# Patient Record
Sex: MALE | Race: WHITE | NOT HISPANIC OR LATINO | Employment: OTHER | URBAN - METROPOLITAN AREA
[De-identification: names, ages, dates, MRNs, and addresses within clinical notes are randomized per-mention and may not be internally consistent; named-entity substitution may affect disease eponyms.]

---

## 2017-01-09 RX ORDER — ATORVASTATIN CALCIUM 40 MG/1
TABLET, FILM COATED ORAL
Qty: 15 TABLET | Refills: 3 | Status: SHIPPED | OUTPATIENT
Start: 2017-01-09 | End: 2017-05-12 | Stop reason: SDUPTHER

## 2017-02-06 ENCOUNTER — CLINICAL SUPPORT NO REQUIREMENTS (OUTPATIENT)
Dept: CARDIOLOGY | Facility: CLINIC | Age: 82
End: 2017-02-06

## 2017-02-06 DIAGNOSIS — I49.5 SINOATRIAL NODE DYSFUNCTION (HCC): Primary | ICD-10-CM

## 2017-02-06 PROCEDURE — 93288 INTERROG EVL PM/LDLS PM IP: CPT | Performed by: INTERNAL MEDICINE

## 2017-03-21 ENCOUNTER — OFFICE VISIT (OUTPATIENT)
Dept: INTERNAL MEDICINE | Age: 82
End: 2017-03-21

## 2017-03-21 VITALS
RESPIRATION RATE: 12 BRPM | SYSTOLIC BLOOD PRESSURE: 110 MMHG | DIASTOLIC BLOOD PRESSURE: 60 MMHG | WEIGHT: 178 LBS | BODY MASS INDEX: 23.59 KG/M2 | HEIGHT: 73 IN | HEART RATE: 66 BPM

## 2017-03-21 DIAGNOSIS — E78.2 MIXED HYPERLIPIDEMIA: Primary | ICD-10-CM

## 2017-03-21 LAB
ALBUMIN SERPL-MCNC: 4.1 G/DL (ref 3.5–5.2)
ALBUMIN/GLOB SERPL: 1.2 G/DL
ALP SERPL-CCNC: 83 U/L (ref 39–117)
ALT SERPL-CCNC: 12 U/L (ref 1–41)
AST SERPL-CCNC: 20 U/L (ref 1–40)
BILIRUB SERPL-MCNC: 0.5 MG/DL (ref 0.1–1.2)
BUN SERPL-MCNC: 22 MG/DL (ref 8–23)
BUN/CREAT SERPL: 19.6 (ref 7–25)
CALCIUM SERPL-MCNC: 9.5 MG/DL (ref 8.6–10.5)
CHLORIDE SERPL-SCNC: 106 MMOL/L (ref 98–107)
CHOLEST SERPL-MCNC: 125 MG/DL (ref 0–200)
CO2 SERPL-SCNC: 30.7 MMOL/L (ref 22–29)
CREAT SERPL-MCNC: 1.12 MG/DL (ref 0.76–1.27)
GLOBULIN SER CALC-MCNC: 3.3 GM/DL
GLUCOSE SERPL-MCNC: 96 MG/DL (ref 65–99)
HDLC SERPL-MCNC: 49 MG/DL (ref 40–60)
LDLC SERPL CALC-MCNC: 67 MG/DL (ref 0–100)
POTASSIUM SERPL-SCNC: 4.9 MMOL/L (ref 3.5–5.2)
PROT SERPL-MCNC: 7.4 G/DL (ref 6–8.5)
SODIUM SERPL-SCNC: 146 MMOL/L (ref 136–145)
TRIGL SERPL-MCNC: 46 MG/DL (ref 0–150)
VLDLC SERPL CALC-MCNC: 9.2 MG/DL (ref 5–40)

## 2017-03-21 PROCEDURE — 99213 OFFICE O/P EST LOW 20 MIN: CPT | Performed by: INTERNAL MEDICINE

## 2017-03-21 NOTE — PROGRESS NOTES
Amaury Moulton is a 82 y.o. male who presents with   Chief Complaint   Patient presents with   • Hyperlipidemia     Checkup; lab updates.   .    Hyperlipidemia   This is a chronic problem. The current episode started more than 1 year ago. The problem is controlled. Recent lipid tests were reviewed and are normal. Current antihyperlipidemic treatment includes statins. The current treatment provides moderate improvement of lipids. There are no compliance problems.         The following portions of the patient's history were reviewed and updated as appropriate: allergies, current medications, past medical history and problem list.    Review of Systems   Constitutional: Negative.    HENT: Negative.    Eyes: Negative.    Respiratory: Negative.    Cardiovascular: Negative.    Genitourinary: Negative.    Musculoskeletal: Negative.    Skin: Negative.    Neurological: Negative.    Psychiatric/Behavioral: Negative.        Objective   Physical Exam   Constitutional: He is oriented to person, place, and time. He appears well-developed and well-nourished. No distress.   HENT:   Head: Normocephalic and atraumatic.   Eyes: Conjunctivae and EOM are normal. Pupils are equal, round, and reactive to light.   Neck: Normal range of motion. Neck supple. No thyromegaly present.   Neck exam negative.  Carotid auscultation normal-no bruits heard.   Cardiovascular: Normal rate, regular rhythm, normal heart sounds and intact distal pulses.  Exam reveals no gallop and no friction rub.    No murmur heard.  Pulmonary/Chest: Effort normal and breath sounds normal. No respiratory distress. He has no wheezes. He has no rales. He exhibits no tenderness.   Neurological: He is alert and oriented to person, place, and time.   Psychiatric: He has a normal mood and affect. His behavior is normal. Judgment and thought content normal.   Nursing note and vitals reviewed.      Assessment/Plan   Amaury was seen today for hyperlipidemia.    Diagnoses and all  orders for this visit:    Mixed hyperlipidemia  -     Comprehensive Metabolic Panel  -     Lipid Panel          Plan: Labs as above.Today's exam unremarkable for any new problems or events.  Continue all current treatment as prescribed.  A follow-up checkup/lab update visit is advised for 6 months.

## 2017-04-27 ENCOUNTER — CLINICAL SUPPORT NO REQUIREMENTS (OUTPATIENT)
Dept: CARDIOLOGY | Facility: CLINIC | Age: 82
End: 2017-04-27

## 2017-04-27 ENCOUNTER — OFFICE VISIT (OUTPATIENT)
Dept: CARDIOLOGY | Facility: CLINIC | Age: 82
End: 2017-04-27

## 2017-04-27 VITALS
HEART RATE: 65 BPM | WEIGHT: 172 LBS | SYSTOLIC BLOOD PRESSURE: 96 MMHG | HEIGHT: 73 IN | BODY MASS INDEX: 22.8 KG/M2 | DIASTOLIC BLOOD PRESSURE: 62 MMHG

## 2017-04-27 DIAGNOSIS — R00.1 BRADYCARDIA: Primary | ICD-10-CM

## 2017-04-27 DIAGNOSIS — I49.5 SICK SINUS SYNDROME (HCC): Primary | ICD-10-CM

## 2017-04-27 PROCEDURE — 99213 OFFICE O/P EST LOW 20 MIN: CPT | Performed by: INTERNAL MEDICINE

## 2017-04-27 PROCEDURE — 93280 PM DEVICE PROGR EVAL DUAL: CPT | Performed by: INTERNAL MEDICINE

## 2017-04-27 NOTE — PROGRESS NOTES
Date of Office Visit: 2017  Encounter Provider: Terrance Allison MD  Place of Service: [facility] CARDIOLOGY  Patient Name: Amaury Moulton  :1935      Chief Complaint   Patient presents with   • Slow Heart Rate     History of Present Illness    Patient is an 82-year-old white male with a history of bradycardia, sick sinus syndrome status post permanent pacemaker implant.  He returns to the office today for follow-up.  He's feeling well without any major complaints other than occasional fatigue issues.    Past Medical History:   Diagnosis Date   • Arrhythmia    • Atherosclerotic vascular disease    • Benign prostatic hypertrophy    • Bladder calculus     SEES DR. MOYA - UROL   • Cardiomegaly    • Hyperlipidemia    • Inguinal hernia    • Peripheral edema    • Urinary tract infection          Past Surgical History:   Procedure Laterality Date   • CARDIAC PACEMAKER PLACEMENT      Octoberâ€“2014â€“Dr. Allison and associates.   • COLONOSCOPY      €“normalâ€“Dr. Ahmet Harvey (surgery)   • HERNIA REPAIR      €“Dr. Ahmet Harveyâ€“patient had surgery for a left inguinal hernia however at the time of surgery no hernia was found either internally or externally according to the surgical report.           Current Outpatient Prescriptions:   •  atorvastatin (LIPITOR) 40 MG tablet, TAKE ONE-HALF TABLET BY MOUTH DAILY, Disp: 15 tablet, Rfl: 3  •  finasteride (PROSCAR) 5 MG tablet, Take  by mouth daily., Disp: , Rfl:   •  tamsulosin (FLOMAX) 0.4 MG capsule 24 hr capsule, Take  by mouth., Disp: , Rfl:       Social History     Social History   • Marital status:      Spouse name: N/A   • Number of children: N/A   • Years of education: N/A     Occupational History   • Not on file.     Social History Main Topics   • Smoking status: Never Smoker   • Smokeless tobacco: Not on file   • Alcohol use Not on file   • Drug use: Not on file   • Sexual activity: Not  "on file     Other Topics Concern   • Not on file     Social History Narrative         Review of Systems   Constitution: Negative.   HENT: Negative.    Eyes: Negative.    Cardiovascular: Negative.    Respiratory: Negative.    Endocrine: Negative.    Skin: Negative.    Musculoskeletal: Negative.    Gastrointestinal: Negative.    Neurological: Negative.    Psychiatric/Behavioral: Negative.        Procedures    Procedures       Objective:    BP 96/62  Pulse 65  Ht 73\" (185.4 cm)  Wt 172 lb (78 kg)  BMI 22.69 kg/m2        Physical Exam   Constitutional: He is oriented to person, place, and time. He appears well-developed and well-nourished.   HENT:   Head: Normocephalic.   Eyes: Pupils are equal, round, and reactive to light.   Neck: Normal range of motion. No JVD present. Carotid bruit is not present. No thyromegaly present.   Cardiovascular: Normal rate, regular rhythm, S1 normal, S2 normal, normal heart sounds and intact distal pulses.  Exam reveals no gallop and no friction rub.    No murmur heard.  Pulmonary/Chest: Effort normal and breath sounds normal.   Abdominal: Soft. Bowel sounds are normal.   Musculoskeletal: He exhibits no edema.   Neurological: He is alert and oriented to person, place, and time.   Skin: Skin is warm, dry and intact. No erythema.   Psychiatric: He has a normal mood and affect.   Vitals reviewed.          Assessment:       Diagnosis Plan   1. Bradycardia         Patient's pacemaker is working appropriately.     Plan:       He will follow-up in one year  "

## 2017-05-07 ENCOUNTER — DOCUMENTATION (OUTPATIENT)
Dept: INTERNAL MEDICINE | Age: 82
End: 2017-05-07

## 2017-05-08 ENCOUNTER — TELEPHONE (OUTPATIENT)
Dept: ORTHOPEDIC SURGERY | Facility: CLINIC | Age: 82
End: 2017-05-08

## 2017-05-08 ENCOUNTER — TELEPHONE (OUTPATIENT)
Dept: INTERNAL MEDICINE | Age: 82
End: 2017-05-08

## 2017-05-08 DIAGNOSIS — S42.296S OTHER CLOSED NONDISPLACED FRACTURE OF PROXIMAL END OF HUMERUS, UNSPECIFIED LATERALITY, SEQUELA: Primary | ICD-10-CM

## 2017-05-10 ENCOUNTER — OFFICE VISIT (OUTPATIENT)
Dept: ORTHOPEDIC SURGERY | Facility: CLINIC | Age: 82
End: 2017-05-10

## 2017-05-10 VITALS — BODY MASS INDEX: 23.19 KG/M2 | TEMPERATURE: 98.1 F | WEIGHT: 175 LBS | HEIGHT: 73 IN

## 2017-05-10 DIAGNOSIS — S49.91XA RIGHT SHOULDER INJURY, INITIAL ENCOUNTER: Primary | ICD-10-CM

## 2017-05-10 PROCEDURE — 99204 OFFICE O/P NEW MOD 45 MIN: CPT | Performed by: ORTHOPAEDIC SURGERY

## 2017-05-10 PROCEDURE — 73030 X-RAY EXAM OF SHOULDER: CPT | Performed by: ORTHOPAEDIC SURGERY

## 2017-05-11 ENCOUNTER — TELEPHONE (OUTPATIENT)
Dept: CARDIOLOGY | Facility: CLINIC | Age: 82
End: 2017-05-11

## 2017-05-11 ENCOUNTER — TELEPHONE (OUTPATIENT)
Dept: INTERNAL MEDICINE | Age: 82
End: 2017-05-11

## 2017-05-11 RX ORDER — SODIUM CHLORIDE 0.9 % (FLUSH) 0.9 %
1-10 SYRINGE (ML) INJECTION AS NEEDED
Status: CANCELLED | OUTPATIENT
Start: 2017-05-11

## 2017-05-11 RX ORDER — CEFAZOLIN SODIUM 2 G/100ML
2 INJECTION, SOLUTION INTRAVENOUS ONCE
Status: CANCELLED | OUTPATIENT
Start: 2017-05-11 | End: 2017-05-11

## 2017-05-12 ENCOUNTER — APPOINTMENT (OUTPATIENT)
Dept: PREADMISSION TESTING | Facility: HOSPITAL | Age: 82
End: 2017-05-12

## 2017-05-12 VITALS
SYSTOLIC BLOOD PRESSURE: 116 MMHG | WEIGHT: 176 LBS | OXYGEN SATURATION: 99 % | HEIGHT: 73 IN | HEART RATE: 61 BPM | TEMPERATURE: 96.4 F | BODY MASS INDEX: 23.33 KG/M2 | DIASTOLIC BLOOD PRESSURE: 79 MMHG | RESPIRATION RATE: 20 BRPM

## 2017-05-12 LAB
ANION GAP SERPL CALCULATED.3IONS-SCNC: 12 MMOL/L
BUN BLD-MCNC: 41 MG/DL (ref 8–23)
BUN/CREAT SERPL: 24 (ref 7–25)
CALCIUM SPEC-SCNC: 9 MG/DL (ref 8.6–10.5)
CHLORIDE SERPL-SCNC: 102 MMOL/L (ref 98–107)
CO2 SERPL-SCNC: 25 MMOL/L (ref 22–29)
CREAT BLD-MCNC: 1.71 MG/DL (ref 0.76–1.27)
DEPRECATED RDW RBC AUTO: 47.5 FL (ref 37–54)
ERYTHROCYTE [DISTWIDTH] IN BLOOD BY AUTOMATED COUNT: 13.7 % (ref 11.5–14.5)
GFR SERPL CREATININE-BSD FRML MDRD: 39 ML/MIN/1.73
GLUCOSE BLD-MCNC: 118 MG/DL (ref 65–99)
HCT VFR BLD AUTO: 35.5 % (ref 40.4–52.2)
HGB BLD-MCNC: 11.3 G/DL (ref 13.7–17.6)
MCH RBC QN AUTO: 30.3 PG (ref 27–32.7)
MCHC RBC AUTO-ENTMCNC: 31.8 G/DL (ref 32.6–36.4)
MCV RBC AUTO: 95.2 FL (ref 79.8–96.2)
PLATELET # BLD AUTO: 277 10*3/MM3 (ref 140–500)
PMV BLD AUTO: 9.7 FL (ref 6–12)
POTASSIUM BLD-SCNC: 4.2 MMOL/L (ref 3.5–5.2)
RBC # BLD AUTO: 3.73 10*6/MM3 (ref 4.6–6)
SODIUM BLD-SCNC: 139 MMOL/L (ref 136–145)
WBC NRBC COR # BLD: 6.7 10*3/MM3 (ref 4.5–10.7)

## 2017-05-12 PROCEDURE — 80048 BASIC METABOLIC PNL TOTAL CA: CPT | Performed by: ORTHOPAEDIC SURGERY

## 2017-05-12 PROCEDURE — 36415 COLL VENOUS BLD VENIPUNCTURE: CPT

## 2017-05-12 PROCEDURE — 93005 ELECTROCARDIOGRAM TRACING: CPT

## 2017-05-12 PROCEDURE — 85027 COMPLETE CBC AUTOMATED: CPT | Performed by: ORTHOPAEDIC SURGERY

## 2017-05-12 PROCEDURE — 93010 ELECTROCARDIOGRAM REPORT: CPT | Performed by: INTERNAL MEDICINE

## 2017-05-12 RX ORDER — IBUPROFEN 200 MG
200 TABLET ORAL EVERY 6 HOURS PRN
Status: ON HOLD | COMMUNITY
End: 2018-01-01

## 2017-05-12 RX ORDER — ACETAMINOPHEN 500 MG
500 TABLET ORAL EVERY 6 HOURS PRN
COMMUNITY
End: 2017-05-15 | Stop reason: HOSPADM

## 2017-05-12 RX ORDER — ATORVASTATIN CALCIUM 40 MG/1
40 TABLET, FILM COATED ORAL EVERY OTHER DAY
COMMUNITY
End: 2017-06-20 | Stop reason: SDUPTHER

## 2017-05-15 ENCOUNTER — ANESTHESIA EVENT (OUTPATIENT)
Dept: PERIOP | Facility: HOSPITAL | Age: 82
End: 2017-05-15

## 2017-05-15 ENCOUNTER — HOSPITAL ENCOUNTER (OUTPATIENT)
Facility: HOSPITAL | Age: 82
Setting detail: SURGERY ADMIT
Discharge: HOME | End: 2017-05-15
Attending: ORTHOPAEDIC SURGERY | Admitting: ORTHOPAEDIC SURGERY

## 2017-05-15 ENCOUNTER — ANESTHESIA (OUTPATIENT)
Dept: PERIOP | Facility: HOSPITAL | Age: 82
End: 2017-05-15

## 2017-05-15 ENCOUNTER — APPOINTMENT (OUTPATIENT)
Dept: GENERAL RADIOLOGY | Facility: HOSPITAL | Age: 82
End: 2017-05-15

## 2017-05-15 VITALS
DIASTOLIC BLOOD PRESSURE: 72 MMHG | OXYGEN SATURATION: 99 % | TEMPERATURE: 97.9 F | HEART RATE: 61 BPM | SYSTOLIC BLOOD PRESSURE: 135 MMHG | RESPIRATION RATE: 18 BRPM

## 2017-05-15 DIAGNOSIS — S49.91XA RIGHT SHOULDER INJURY, INITIAL ENCOUNTER: ICD-10-CM

## 2017-05-15 PROCEDURE — 25010000002 FENTANYL CITRATE (PF) 100 MCG/2ML SOLUTION: Performed by: ANESTHESIOLOGY

## 2017-05-15 PROCEDURE — 25010000002 DEXAMETHASONE PER 1 MG: Performed by: NURSE ANESTHETIST, CERTIFIED REGISTERED

## 2017-05-15 PROCEDURE — C1713 ANCHOR/SCREW BN/BN,TIS/BN: HCPCS | Performed by: ORTHOPAEDIC SURGERY

## 2017-05-15 PROCEDURE — 25010000003 CEFAZOLIN IN DEXTROSE 2-4 GM/100ML-% SOLUTION: Performed by: ORTHOPAEDIC SURGERY

## 2017-05-15 PROCEDURE — 25010000002 FENTANYL CITRATE (PF) 100 MCG/2ML SOLUTION: Performed by: NURSE ANESTHETIST, CERTIFIED REGISTERED

## 2017-05-15 PROCEDURE — 25010000002 ONDANSETRON PER 1 MG: Performed by: NURSE ANESTHETIST, CERTIFIED REGISTERED

## 2017-05-15 PROCEDURE — 25010000002 MIDAZOLAM PER 1 MG: Performed by: ANESTHESIOLOGY

## 2017-05-15 PROCEDURE — 23615 OPTX PROX HUMRL FX W/INT FIX: CPT | Performed by: ORTHOPAEDIC SURGERY

## 2017-05-15 PROCEDURE — 25010000002 PROPOFOL 10 MG/ML EMULSION: Performed by: NURSE ANESTHETIST, CERTIFIED REGISTERED

## 2017-05-15 PROCEDURE — 25010000002 PHENYLEPHRINE PER 1 ML: Performed by: NURSE ANESTHETIST, CERTIFIED REGISTERED

## 2017-05-15 PROCEDURE — 76000 FLUOROSCOPY <1 HR PHYS/QHP: CPT

## 2017-05-15 PROCEDURE — 73060 X-RAY EXAM OF HUMERUS: CPT

## 2017-05-15 DEVICE — PLT HUM LCP PROX STD SS 3H 3.5X90MM: Type: IMPLANTABLE DEVICE | Status: FUNCTIONAL

## 2017-05-15 DEVICE — SCRW LK S/TAP STRDRV 3.5X32MM: Type: IMPLANTABLE DEVICE | Status: FUNCTIONAL

## 2017-05-15 DEVICE — SCRW LK S/TAP STRDRV 3.5X46MM: Type: IMPLANTABLE DEVICE | Status: FUNCTIONAL

## 2017-05-15 DEVICE — SCRW LK S/TAP STRDRV 3.5X40MM: Type: IMPLANTABLE DEVICE | Status: FUNCTIONAL

## 2017-05-15 DEVICE — SCRW LK S/TAP STRDRV 3.5X42MM: Type: IMPLANTABLE DEVICE | Status: FUNCTIONAL

## 2017-05-15 DEVICE — SCRW LK S/TAP STRDRV 3.5X38MM: Type: IMPLANTABLE DEVICE | Status: FUNCTIONAL

## 2017-05-15 DEVICE — SCRW LK S/TAP STRDRV 3.5X50MM: Type: IMPLANTABLE DEVICE | Status: FUNCTIONAL

## 2017-05-15 DEVICE — GII QUICKANCHOR PLUS SIZE 2 (5 METRIC) PANACRYL BRAIDED ABSORBABLE SUTURE 36 INCHES (91CM), DOUBLE ARMED WITH CP-2 NEEDLES, WITH DISPOSABLE INSERTER.
Type: IMPLANTABLE DEVICE | Status: FUNCTIONAL
Brand: GII QUICKANCHOR PANACRYL

## 2017-05-15 DEVICE — SCRW CORT S/TAP 3.5X38MM: Type: IMPLANTABLE DEVICE | Status: FUNCTIONAL

## 2017-05-15 RX ORDER — ROCURONIUM BROMIDE 10 MG/ML
INJECTION, SOLUTION INTRAVENOUS AS NEEDED
Status: DISCONTINUED | OUTPATIENT
Start: 2017-05-15 | End: 2017-05-15 | Stop reason: SURG

## 2017-05-15 RX ORDER — FAMOTIDINE 10 MG/ML
20 INJECTION, SOLUTION INTRAVENOUS ONCE
Status: COMPLETED | OUTPATIENT
Start: 2017-05-15 | End: 2017-05-15

## 2017-05-15 RX ORDER — DOCUSATE SODIUM 100 MG/1
100 CAPSULE, LIQUID FILLED ORAL 2 TIMES DAILY
Qty: 50 CAPSULE | Refills: 0 | Status: SHIPPED | OUTPATIENT
Start: 2017-05-15 | End: 2017-10-13

## 2017-05-15 RX ORDER — ONDANSETRON 4 MG/1
4 TABLET, FILM COATED ORAL EVERY 8 HOURS PRN
Qty: 20 TABLET | Refills: 0 | Status: SHIPPED | OUTPATIENT
Start: 2017-05-15 | End: 2017-09-22

## 2017-05-15 RX ORDER — MIDAZOLAM HYDROCHLORIDE 1 MG/ML
1 INJECTION INTRAMUSCULAR; INTRAVENOUS
Status: DISCONTINUED | OUTPATIENT
Start: 2017-05-15 | End: 2017-05-15 | Stop reason: HOSPADM

## 2017-05-15 RX ORDER — IPRATROPIUM BROMIDE AND ALBUTEROL SULFATE 2.5; .5 MG/3ML; MG/3ML
3 SOLUTION RESPIRATORY (INHALATION) ONCE AS NEEDED
Status: DISCONTINUED | OUTPATIENT
Start: 2017-05-15 | End: 2017-05-15 | Stop reason: HOSPADM

## 2017-05-15 RX ORDER — SODIUM CHLORIDE, SODIUM LACTATE, POTASSIUM CHLORIDE, CALCIUM CHLORIDE 600; 310; 30; 20 MG/100ML; MG/100ML; MG/100ML; MG/100ML
9 INJECTION, SOLUTION INTRAVENOUS CONTINUOUS
Status: DISCONTINUED | OUTPATIENT
Start: 2017-05-15 | End: 2017-05-15 | Stop reason: HOSPADM

## 2017-05-15 RX ORDER — HYDRALAZINE HYDROCHLORIDE 20 MG/ML
5 INJECTION INTRAMUSCULAR; INTRAVENOUS
Status: DISCONTINUED | OUTPATIENT
Start: 2017-05-15 | End: 2017-05-15 | Stop reason: HOSPADM

## 2017-05-15 RX ORDER — SODIUM CHLORIDE 0.9 % (FLUSH) 0.9 %
1-10 SYRINGE (ML) INJECTION AS NEEDED
Status: DISCONTINUED | OUTPATIENT
Start: 2017-05-15 | End: 2017-05-15 | Stop reason: HOSPADM

## 2017-05-15 RX ORDER — HYDROCODONE BITARTRATE AND ACETAMINOPHEN 5; 325 MG/1; MG/1
1-2 TABLET ORAL EVERY 4 HOURS PRN
Qty: 50 TABLET | Refills: 0 | Status: SHIPPED | OUTPATIENT
Start: 2017-05-15 | End: 2017-05-18 | Stop reason: SDUPTHER

## 2017-05-15 RX ORDER — HYDROCODONE BITARTRATE AND ACETAMINOPHEN 5; 325 MG/1; MG/1
1 TABLET ORAL EVERY 6 HOURS PRN
Status: DISCONTINUED | OUTPATIENT
Start: 2017-05-15 | End: 2017-05-15 | Stop reason: HOSPADM

## 2017-05-15 RX ORDER — FENTANYL CITRATE 50 UG/ML
25 INJECTION, SOLUTION INTRAMUSCULAR; INTRAVENOUS
Status: DISCONTINUED | OUTPATIENT
Start: 2017-05-15 | End: 2017-05-15 | Stop reason: HOSPADM

## 2017-05-15 RX ORDER — MIDAZOLAM HYDROCHLORIDE 1 MG/ML
2 INJECTION INTRAMUSCULAR; INTRAVENOUS
Status: DISCONTINUED | OUTPATIENT
Start: 2017-05-15 | End: 2017-05-15 | Stop reason: HOSPADM

## 2017-05-15 RX ORDER — DEXAMETHASONE SODIUM PHOSPHATE 10 MG/ML
INJECTION INTRAMUSCULAR; INTRAVENOUS AS NEEDED
Status: DISCONTINUED | OUTPATIENT
Start: 2017-05-15 | End: 2017-05-15 | Stop reason: SURG

## 2017-05-15 RX ORDER — LABETALOL HYDROCHLORIDE 5 MG/ML
5 INJECTION, SOLUTION INTRAVENOUS
Status: DISCONTINUED | OUTPATIENT
Start: 2017-05-15 | End: 2017-05-15 | Stop reason: HOSPADM

## 2017-05-15 RX ORDER — FAMOTIDINE 10 MG/ML
20 INJECTION, SOLUTION INTRAVENOUS ONCE
Status: DISCONTINUED | OUTPATIENT
Start: 2017-05-15 | End: 2017-05-15 | Stop reason: HOSPADM

## 2017-05-15 RX ORDER — FENTANYL CITRATE 50 UG/ML
50 INJECTION, SOLUTION INTRAMUSCULAR; INTRAVENOUS
Status: DISCONTINUED | OUTPATIENT
Start: 2017-05-15 | End: 2017-05-15 | Stop reason: HOSPADM

## 2017-05-15 RX ORDER — ONDANSETRON 2 MG/ML
4 INJECTION INTRAMUSCULAR; INTRAVENOUS ONCE AS NEEDED
Status: DISCONTINUED | OUTPATIENT
Start: 2017-05-15 | End: 2017-05-15 | Stop reason: HOSPADM

## 2017-05-15 RX ORDER — CEFAZOLIN SODIUM 2 G/100ML
2 INJECTION, SOLUTION INTRAVENOUS ONCE
Status: COMPLETED | OUTPATIENT
Start: 2017-05-15 | End: 2017-05-15

## 2017-05-15 RX ORDER — PROPOFOL 10 MG/ML
VIAL (ML) INTRAVENOUS AS NEEDED
Status: DISCONTINUED | OUTPATIENT
Start: 2017-05-15 | End: 2017-05-15 | Stop reason: SURG

## 2017-05-15 RX ORDER — LIDOCAINE HYDROCHLORIDE 20 MG/ML
INJECTION, SOLUTION INFILTRATION; PERINEURAL AS NEEDED
Status: DISCONTINUED | OUTPATIENT
Start: 2017-05-15 | End: 2017-05-15 | Stop reason: SURG

## 2017-05-15 RX ORDER — ONDANSETRON 2 MG/ML
INJECTION INTRAMUSCULAR; INTRAVENOUS AS NEEDED
Status: DISCONTINUED | OUTPATIENT
Start: 2017-05-15 | End: 2017-05-15 | Stop reason: SURG

## 2017-05-15 RX ORDER — FENTANYL CITRATE 50 UG/ML
INJECTION, SOLUTION INTRAMUSCULAR; INTRAVENOUS AS NEEDED
Status: DISCONTINUED | OUTPATIENT
Start: 2017-05-15 | End: 2017-05-15 | Stop reason: SURG

## 2017-05-15 RX ORDER — HYDROMORPHONE HYDROCHLORIDE 1 MG/ML
0.25 INJECTION, SOLUTION INTRAMUSCULAR; INTRAVENOUS; SUBCUTANEOUS
Status: DISCONTINUED | OUTPATIENT
Start: 2017-05-15 | End: 2017-05-15 | Stop reason: HOSPADM

## 2017-05-15 RX ADMIN — ONDANSETRON 4 MG: 2 INJECTION INTRAMUSCULAR; INTRAVENOUS at 14:30

## 2017-05-15 RX ADMIN — FAMOTIDINE 20 MG: 10 INJECTION, SOLUTION INTRAVENOUS at 12:44

## 2017-05-15 RX ADMIN — ROCURONIUM BROMIDE 40 MG: 10 INJECTION INTRAVENOUS at 13:10

## 2017-05-15 RX ADMIN — PHENYLEPHRINE HYDROCHLORIDE 100 MCG: 10 INJECTION INTRAVENOUS at 13:20

## 2017-05-15 RX ADMIN — MIDAZOLAM 2 MG: 1 INJECTION INTRAMUSCULAR; INTRAVENOUS at 12:44

## 2017-05-15 RX ADMIN — PHENYLEPHRINE HYDROCHLORIDE 100 MCG: 10 INJECTION INTRAVENOUS at 13:57

## 2017-05-15 RX ADMIN — SODIUM CHLORIDE, POTASSIUM CHLORIDE, SODIUM LACTATE AND CALCIUM CHLORIDE 9 ML/HR: 600; 310; 30; 20 INJECTION, SOLUTION INTRAVENOUS at 15:48

## 2017-05-15 RX ADMIN — FENTANYL CITRATE 50 MCG: 50 INJECTION INTRAMUSCULAR; INTRAVENOUS at 12:51

## 2017-05-15 RX ADMIN — LIDOCAINE HYDROCHLORIDE 60 MG: 20 INJECTION, SOLUTION INFILTRATION; PERINEURAL at 13:10

## 2017-05-15 RX ADMIN — CEFAZOLIN SODIUM 2 G: 2 INJECTION, SOLUTION INTRAVENOUS at 13:20

## 2017-05-15 RX ADMIN — HYDROCODONE BITARTRATE AND ACETAMINOPHEN 1 TABLET: 5; 325 TABLET ORAL at 16:59

## 2017-05-15 RX ADMIN — SODIUM CHLORIDE, POTASSIUM CHLORIDE, SODIUM LACTATE AND CALCIUM CHLORIDE: 600; 310; 30; 20 INJECTION, SOLUTION INTRAVENOUS at 12:00

## 2017-05-15 RX ADMIN — PHENYLEPHRINE HYDROCHLORIDE 100 MCG: 10 INJECTION INTRAVENOUS at 13:34

## 2017-05-15 RX ADMIN — DEXAMETHASONE SODIUM PHOSPHATE 4 MG: 10 INJECTION INTRAMUSCULAR; INTRAVENOUS at 13:30

## 2017-05-15 RX ADMIN — SODIUM CHLORIDE, POTASSIUM CHLORIDE, SODIUM LACTATE AND CALCIUM CHLORIDE 9 ML/HR: 600; 310; 30; 20 INJECTION, SOLUTION INTRAVENOUS at 12:45

## 2017-05-15 RX ADMIN — PROPOFOL 120 MG: 10 INJECTION, EMULSION INTRAVENOUS at 13:10

## 2017-05-15 RX ADMIN — FENTANYL CITRATE 50 MCG: 50 INJECTION INTRAMUSCULAR; INTRAVENOUS at 12:44

## 2017-05-15 RX ADMIN — FENTANYL CITRATE 50 MCG: 50 INJECTION INTRAMUSCULAR; INTRAVENOUS at 13:43

## 2017-05-15 RX ADMIN — PHENYLEPHRINE HYDROCHLORIDE 100 MCG: 10 INJECTION INTRAVENOUS at 14:15

## 2017-05-15 RX ADMIN — SUGAMMADEX 300 MG: 100 INJECTION, SOLUTION INTRAVENOUS at 14:36

## 2017-05-19 RX ORDER — HYDROCODONE BITARTRATE AND ACETAMINOPHEN 5; 325 MG/1; MG/1
1-2 TABLET ORAL EVERY 4 HOURS PRN
Qty: 50 TABLET | Refills: 0 | Status: SHIPPED | OUTPATIENT
Start: 2017-05-19 | End: 2017-09-22

## 2017-05-26 ENCOUNTER — TELEPHONE (OUTPATIENT)
Dept: ORTHOPEDIC SURGERY | Facility: CLINIC | Age: 82
End: 2017-05-26

## 2017-05-26 ENCOUNTER — OFFICE VISIT (OUTPATIENT)
Dept: ORTHOPEDIC SURGERY | Facility: CLINIC | Age: 82
End: 2017-05-26

## 2017-05-26 VITALS — BODY MASS INDEX: 23.19 KG/M2 | HEIGHT: 73 IN | TEMPERATURE: 98.6 F | WEIGHT: 175 LBS

## 2017-05-26 DIAGNOSIS — S42.201D CLOSED FRACTURE OF PROXIMAL END OF RIGHT HUMERUS WITH ROUTINE HEALING, UNSPECIFIED FRACTURE MORPHOLOGY, SUBSEQUENT ENCOUNTER: ICD-10-CM

## 2017-05-26 DIAGNOSIS — Z87.81 S/P ORIF (OPEN REDUCTION INTERNAL FIXATION) FRACTURE: Primary | ICD-10-CM

## 2017-05-26 DIAGNOSIS — Z98.890 S/P ORIF (OPEN REDUCTION INTERNAL FIXATION) FRACTURE: Primary | ICD-10-CM

## 2017-05-26 PROCEDURE — 99024 POSTOP FOLLOW-UP VISIT: CPT | Performed by: ORTHOPAEDIC SURGERY

## 2017-05-26 PROCEDURE — 73030 X-RAY EXAM OF SHOULDER: CPT | Performed by: ORTHOPAEDIC SURGERY

## 2017-06-21 RX ORDER — ATORVASTATIN CALCIUM 40 MG/1
TABLET, FILM COATED ORAL
Qty: 15 TABLET | Refills: 1 | Status: SHIPPED | OUTPATIENT
Start: 2017-06-21 | End: 2017-09-14 | Stop reason: SDUPTHER

## 2017-06-23 ENCOUNTER — OFFICE VISIT (OUTPATIENT)
Dept: ORTHOPEDIC SURGERY | Facility: CLINIC | Age: 82
End: 2017-06-23

## 2017-06-23 VITALS — WEIGHT: 175 LBS | HEIGHT: 72 IN | BODY MASS INDEX: 23.7 KG/M2

## 2017-06-23 DIAGNOSIS — Z98.890 S/P ORIF (OPEN REDUCTION INTERNAL FIXATION) FRACTURE: Primary | ICD-10-CM

## 2017-06-23 DIAGNOSIS — Z87.81 S/P ORIF (OPEN REDUCTION INTERNAL FIXATION) FRACTURE: Primary | ICD-10-CM

## 2017-06-23 DIAGNOSIS — S42.201D CLOSED FRACTURE OF PROXIMAL END OF RIGHT HUMERUS WITH ROUTINE HEALING, UNSPECIFIED FRACTURE MORPHOLOGY, SUBSEQUENT ENCOUNTER: ICD-10-CM

## 2017-06-23 PROCEDURE — 99024 POSTOP FOLLOW-UP VISIT: CPT | Performed by: ORTHOPAEDIC SURGERY

## 2017-06-23 PROCEDURE — 73030 X-RAY EXAM OF SHOULDER: CPT | Performed by: ORTHOPAEDIC SURGERY

## 2017-06-23 NOTE — PROGRESS NOTES
Amaury Moulton : 1935 MRN: [id number] DATE: 2017      CC:  ORIF right proximal humerus fracture    HPI: Pt. returns to clinic today stating pain is improved.  Motion is progressing.  Denies any new concerns or issues.  PT is helping.    There were no vitals filed for this visit.      Current Outpatient Prescriptions:   •  atorvastatin (LIPITOR) 40 MG tablet, TAKE ONE-HALF TABLET BY MOUTH DAILY, Disp: 15 tablet, Rfl: 1  •  docusate sodium (COLACE) 100 MG capsule, Take 1 capsule by mouth 2 (Two) Times a Day., Disp: 50 capsule, Rfl: 0  •  finasteride (PROSCAR) 5 MG tablet, Take 5 mg by mouth Daily., Disp: , Rfl:   •  HYDROcodone-acetaminophen (NORCO) 5-325 MG per tablet, Take 1-2 tablets by mouth Every 4 (Four) Hours As Needed (Pain)., Disp: 50 tablet, Rfl: 0  •  ibuprofen (ADVIL,MOTRIN) 200 MG tablet, Take 200 mg by mouth Every 6 (Six) Hours As Needed for Mild Pain (1-3). HOLD PRIOR TO SURG, Disp: , Rfl:   •  Omega-3 Fatty Acids (OMEGA 3 PO), Take 1 capsule by mouth Daily. HOLD PRIOR TO SURG, Disp: , Rfl:   •  ondansetron (ZOFRAN) 4 MG tablet, Take 1 tablet by mouth Every 8 (Eight) Hours As Needed for Nausea or Vomiting., Disp: 20 tablet, Rfl: 0  •  tamsulosin (FLOMAX) 0.4 MG capsule 24 hr capsule, Take 1 capsule by mouth Every Night., Disp: , Rfl:     Past Medical History:   Diagnosis Date   • Arrhythmia    • Atherosclerotic vascular disease    • Benign prostatic hypertrophy    • Bladder calculus     SEES DR. MOYA - UROL   • Bradycardia    • Cardiomegaly    • History of hepatitis     AGE 21   • Hyperlipidemia    • Peripheral edema    • Weakness        Past Surgical History:   Procedure Laterality Date   • CARDIAC PACEMAKER PLACEMENT      Octoberâ€“2014â€“Dr. Allison and associates.   • CATARACT EXTRACTION     • COLONOSCOPY      €“normalâ€“Dr. Ahmet Harvey (surgery)   • HERNIA REPAIR      €“Dr. Ahmet Harveyâ€“patient had surgery for a left inguinal hernia however at the time of  surgery no hernia was found either internally or externally according to the surgical report.   • TOTAL SHOULDER ARTHROPLASTY W/ DISTAL CLAVICLE EXCISION Right 5/15/2017    Procedure: Open Reduction and Internal Fixation Proximal Humerus;  Surgeon: Hugo Way MD;  Location: [facility];  Service:        History reviewed. No pertinent family history.    Social History     Social History   • Marital status:      Spouse name: N/A   • Number of children: N/A   • Years of education: N/A     Occupational History   • Not on file.     Social History Main Topics   • Smoking status: Never Smoker   • Smokeless tobacco: Never Used   • Alcohol use No   • Drug use: No   • Sexual activity: Defer     Other Topics Concern   • Not on file     Social History Narrative       Exam:   Contour of shoulder appears normal.  Skin intact and benign.  Arm and forearm soft.  Motion is fairly poor-  FE 65 actively, 90 passively.  ER to 40, IR to back pocket.  Good motor and sensory function distally.  Palpable radial pulse with good cap refill.      Imaging   2v xrays including AP and scapular Y are ordered and reviewed by me to evaluate alignment and for comparison purposes.  No new or concerning findings noted. There has been interval callous formation.    Impression:  6 weeks s/p ORIF right2 proximal humerus fracture    Plan:    1.  Progress ROM and strengthening as tolerated.  2.  Continue PT.  3.  F/u in 6 weeks with repeat 3v xrays

## 2017-06-30 ENCOUNTER — HOSPITAL ENCOUNTER (OUTPATIENT)
Dept: PHYSICAL THERAPY | Facility: HOSPITAL | Age: 82
Setting detail: THERAPIES SERIES
Discharge: HOME | End: 2017-06-30
Attending: ORTHOPAEDIC SURGERY

## 2017-06-30 DIAGNOSIS — Z87.81 S/P ORIF (OPEN REDUCTION INTERNAL FIXATION) FRACTURE: Primary | ICD-10-CM

## 2017-06-30 DIAGNOSIS — Z98.890 S/P ORIF (OPEN REDUCTION INTERNAL FIXATION) FRACTURE: Primary | ICD-10-CM

## 2017-06-30 PROCEDURE — 97140 MANUAL THERAPY 1/> REGIONS: CPT | Performed by: PHYSICAL THERAPIST

## 2017-06-30 PROCEDURE — G8984 CARRY CURRENT STATUS: HCPCS | Performed by: PHYSICAL THERAPIST

## 2017-06-30 PROCEDURE — G8985 CARRY GOAL STATUS: HCPCS | Performed by: PHYSICAL THERAPIST

## 2017-06-30 PROCEDURE — 97161 PT EVAL LOW COMPLEX 20 MIN: CPT | Performed by: PHYSICAL THERAPIST

## 2017-06-30 PROCEDURE — 97110 THERAPEUTIC EXERCISES: CPT | Performed by: PHYSICAL THERAPIST

## 2017-07-03 ENCOUNTER — HOSPITAL ENCOUNTER (OUTPATIENT)
Dept: PHYSICAL THERAPY | Facility: HOSPITAL | Age: 82
Setting detail: THERAPIES SERIES
Discharge: HOME | End: 2017-07-03

## 2017-07-03 PROCEDURE — 97140 MANUAL THERAPY 1/> REGIONS: CPT | Performed by: PHYSICAL THERAPIST

## 2017-07-03 PROCEDURE — 97110 THERAPEUTIC EXERCISES: CPT | Performed by: PHYSICAL THERAPIST

## 2017-07-03 NOTE — THERAPY TREATMENT NOTE
Outpatient Physical Therapy Ortho Treatment Note  [facility]     Patient Name: Amaury Moulton  : 1935  MRN: [id number]  Today's Date: 7/3/2017      Visit Date: 2017    Visit Dx:  No diagnosis found.    Patient Active Problem List   Diagnosis   • HLD (hyperlipidemia)   • Hernia, inguinal, left   • Edema, peripheral   • Skin growth   • SBO (small bowel obstruction)   • Bradycardia        Past Medical History:   Diagnosis Date   • Arrhythmia    • Atherosclerotic vascular disease    • Benign prostatic hypertrophy    • Bladder calculus     SEES DR. MOYA - UROL   • Bradycardia    • Cardiomegaly    • History of hepatitis     AGE 21   • Hyperlipidemia    • Peripheral edema    • Weakness         Past Surgical History:   Procedure Laterality Date   • CARDIAC PACEMAKER PLACEMENT      Octoberâ€“2014â€“Dr. Allison and associates.   • CATARACT EXTRACTION     • COLONOSCOPY      €“normalâ€“Dr. Ahmet Harvey (surgery)   • HERNIA REPAIR      €“Dr. Ahmet Johnson€“patient had surgery for a left inguinal hernia however at the time of surgery no hernia was found either internally or externally according to the surgical report.   • TOTAL SHOULDER ARTHROPLASTY W/ DISTAL CLAVICLE EXCISION Right 5/15/2017    Procedure: Open Reduction and Internal Fixation Proximal Humerus;  Surgeon: Hugo Way MD;  Location: [facility] OR;  Service:              PT Ortho       17 1200    Subjective Comments    Subjective Comments Pt reports that his HEP is going well. He continues to have pain with shoulder flexion.  -LC    Subjective Pain    Able to rate subjective pain? yes  -LC    Pre-Treatment Pain Level 4  -LC    Post-Treatment Pain Level 4  -LC    Right Shoulder    Flexion ROM Details A sup 135  -LC    ABduction ROM Details A sup 130  -LC    External Rotation ROM Details P 80  -LC      17 1500    Posture/Observations    Alignment Options Thoracic kyphosis;Rounded shoulders;Forward head   -LC    Thoracic Kyphosis Severe  -LC    Rounded Shoulders Severe  -LC    Sensation    Sensation WNL? WNL  -LC    Left Shoulder    Flexion ROM Details 125  -LC    ABduction ROM Details 110  -LC    External Rotation ROM Details C5  -LC    Internal Rotation ROM Details T7  -LC    Right Shoulder    Flexion ROM Details A - 75; P- 125  -LC    ABduction ROM Details A - 70; P - 110  -LC    External Rotation ROM Details A - C3; P - 60  -LC    Internal Rotation ROM Details A - T7; P - 90  -LC    Left Shoulder    Flexion Gross Movement (4/5) good  -LC    ABduction Gross Movement (4/5) good  -LC    Int Rotation Gross Movement (4/5) good  -LC    Ext Rotation Gross Movement (4/5) good  -LC    Right Shoulder    Flexion Gross Movement (2-/5) poor minus  -LC    ABduction Gross Movement (2-/5) poor minus  -LC    Int Rotation Gross Movement (2-/5) poor minus  -LC    Ext Rotation Gross Movement (2-/5) poor minus  -LC      User Key  (r) = Recorded By, (t) = Taken By, (c) = Cosigned By    Initials Name Provider Type    JAMES Carpenter, PT DPT Physical Therapist                            PT Assessment/Plan       07/03/17 1233       PT Assessment    Functional Limitations Limitations in functional capacity and performance;Performance in self-care ADL;Performance in leisure activities;Limitations in community activities  -     Impairments Endurance;Impaired flexibility;Muscle strength;Pain;Joint mobility;Joint integrity;Range of motion;Motor function  -     Assessment Comments Pt progressed with supine AROM today performing 135 flex. He was progressed to IR/ER theraband strengthening and standing row today. Encouraged pt to continue working on increasing his ROM through wall slides with eccentric lowering at home. He frequently requires cueing to move his arm higher on the wall where he is actually getting a GH joint and shoulder capsule stretch.  -LC     PT Plan    PT Plan Comments Pt requires skilled interventions to include  therex, manual, strengthening, stretching, and ROM.  -LC       User Key  (r) = Recorded By, (t) = Taken By, (c) = Cosigned By    Initials Name Provider Type    JAMES Jj HERRERA ARYA Carpenter DPT Physical Therapist                    Exercises       07/03/17 1200          Subjective Comments    Subjective Comments Pt reports that his HEP is going well. He continues to have pain with shoulder flexion.  -LC      Subjective Pain    Able to rate subjective pain? yes  -LC      Pre-Treatment Pain Level 4  -LC      Post-Treatment Pain Level 4  -LC      Exercise 1    Exercise Name 1 see exercise flowsheet  -LC        User Key  (r) = Recorded By, (t) = Taken By, (c) = Cosigned By    Initials Name Provider Type    JAMES Gardner JAVIER Jayden PT DPT Physical Therapist                        Manual Rx (last 36 hours)      Manual Treatments       07/03/17 1100          Manual Rx 1    Manual Rx 1 Location R shoulder  -LC      Manual Rx 1 Type ROM  -LC      Manual Rx 1 Duration 23 min  -LC        User Key  (r) = Recorded By, (t) = Taken By, (c) = Cosigned By    Initials Name Provider Type    JAMES Gardner JAVIER Jayden PT DPT Physical Therapist                    Therapy Education       07/03/17 1233          Therapy Education    Given HEP;Symptoms/condition management;Pain management  -LC      Program Reinforced  -LC      How Provided Demonstration;Verbal  -LC      Provided to Patient  -LC      Level of Understanding Teach back education performed;Verbalized;Demonstrated  -LC        User Key  (r) = Recorded By, (t) = Taken By, (c) = Cosigned By    Initials Name Provider Type    JAMES Carpenter PT DPT Physical Therapist                Time Calculation:   Start Time: 1200  Stop Time: 1240  Time Calculation (min): 40 min    Therapy Charges for Today     Code Description Service Date Service Provider Modifiers Qty    98449628208  PT THER PROC EA 15 MIN 7/3/2017 Jj Carpenter, PT DPT GP 1    [id number]  PT MANUAL THERAPY EA 15 MIN 7/3/2017 Jj HERRERA  Jayden, PT DPT GP 2                    Jj Carpenter, PT DPT  7/3/2017

## 2017-07-06 ENCOUNTER — HOSPITAL ENCOUNTER (OUTPATIENT)
Dept: PHYSICAL THERAPY | Facility: HOSPITAL | Age: 82
Setting detail: THERAPIES SERIES
Discharge: HOME | End: 2017-07-06

## 2017-07-06 DIAGNOSIS — Z98.890 S/P ORIF (OPEN REDUCTION INTERNAL FIXATION) FRACTURE: Primary | ICD-10-CM

## 2017-07-06 DIAGNOSIS — Z87.81 S/P ORIF (OPEN REDUCTION INTERNAL FIXATION) FRACTURE: Primary | ICD-10-CM

## 2017-07-06 PROCEDURE — 97110 THERAPEUTIC EXERCISES: CPT

## 2017-07-06 PROCEDURE — 97140 MANUAL THERAPY 1/> REGIONS: CPT

## 2017-07-06 NOTE — THERAPY TREATMENT NOTE
Outpatient Physical Therapy Ortho Treatment Note  [facility]     Patient Name: Amaury Moulton  : 1935  MRN: [id number]  Today's Date: 2017      Visit Date: 2017    Visit Dx:    ICD-10-CM ICD-9-CM   1. S/P ORIF (open reduction internal fixation) fracture Z96.7 V45.89    Z87.81 V15.51       Patient Active Problem List   Diagnosis   • HLD (hyperlipidemia)   • Hernia, inguinal, left   • Edema, peripheral   • Skin growth   • SBO (small bowel obstruction)   • Bradycardia        Past Medical History:   Diagnosis Date   • Arrhythmia    • Atherosclerotic vascular disease    • Benign prostatic hypertrophy    • Bladder calculus     SEES DR. MOYA - UROL   • Bradycardia    • Cardiomegaly    • History of hepatitis     AGE 21   • Hyperlipidemia    • Peripheral edema    • Weakness         Past Surgical History:   Procedure Laterality Date   • CARDIAC PACEMAKER PLACEMENT      Octoberâ€“2014â€“Dr. Allison and associates.   • CATARACT EXTRACTION     • COLONOSCOPY      €“normalâ€“Dr. Ahmet Harvey (surgery)   • HERNIA REPAIR      €“Dr. Ahmet Johnson€“patient had surgery for a left inguinal hernia however at the time of surgery no hernia was found either internally or externally according to the surgical report.   • TOTAL SHOULDER ARTHROPLASTY W/ DISTAL CLAVICLE EXCISION Right 5/15/2017    Procedure: Open Reduction and Internal Fixation Proximal Humerus;  Surgeon: Hugo Way MD;  Location: [facility];  Service:              PT Ortho       17 1500    Subjective Comments    Subjective Comments Pt reports no shoulder pain at rest.  Pain with te ex but subsides .  -SI    Subjective Pain    Able to rate subjective pain? yes  -SI    Posture/Observations    Alignment Options Thoracic kyphosis;Rounded shoulders;Forward head  -SI    Thoracic Kyphosis Severe  -SI    Rounded Shoulders Severe  -SI      User Key  (r) = Recorded By, (t) = Taken By, (c) = Cosigned By    Initials Name  Provider Type    DEVAN Marie PTA Physical Therapy Assistant                            PT Assessment/Plan       07/06/17 1517       PT Assessment    Assessment Comments Changed wall slide to table slide due to his extreme kyphosis and at some risk to fall.  Pain with ex but seem tolerable and subsides shortly after finished.  His wife wants him oob more.  She says he sleeps 14 hours....  -SI       User Key  (r) = Recorded By, (t) = Taken By, (c) = Cosigned By    Initials Name Provider Type    DEVAN Marie PTA Physical Therapy Assistant                Modalities       07/06/17 1500          Ice    Ice Applied Yes  -SI      Location right shoulder while on pillow  -SI      Rx Minutes 10 mins  -SI      Ice S/P Rx Yes  -SI        User Key  (r) = Recorded By, (t) = Taken By, (c) = Cosigned By    Initials Name Provider Type    DEVAN Marie PTA Physical Therapy Assistant                Exercises       07/06/17 1500          Subjective Comments    Subjective Comments Pt reports no shoulder pain at rest.  Pain with te ex but subsides .  -SI      Subjective Pain    Able to rate subjective pain? yes  -SI      Exercise 1    Exercise Name 1 see exercise flowsheet  -SI        User Key  (r) = Recorded By, (t) = Taken By, (c) = Cosigned By    Initials Name Provider Type    DEVAN Marie PTA Physical Therapy Assistant                        Manual Rx (last 36 hours)      Manual Treatments       07/06/17 1500          Manual Rx 1    Manual Rx 1 Location R shoulder  -SI      Manual Rx 1 Type ROM  -SI      Manual Rx 1 Duration 25  -SI        User Key  (r) = Recorded By, (t) = Taken By, (c) = Cosigned By    Initials Name Provider Type    DEVAN Marie PTA Physical Therapy Assistant                    Therapy Education       07/06/17 1516          Therapy Education    Given HEP;Symptoms/condition management;Posture/body mechanics;Fall prevention and home safety  -SI      Program Modified  -SI      How  Provided Verbal;Demonstration;Written  -SI      Provided to Patient  -SI      Level of Understanding Teach back education performed  -SI        User Key  (r) = Recorded By, (t) = Taken By, (c) = Cosigned By    Initials Name Provider Type    DEVAN Marie PTA Physical Therapy Assistant                Time Calculation:   Start Time: 1415  Stop Time: 1510  Time Calculation (min): 55 min    Therapy Charges for Today     Code Description Service Date Service Provider Modifiers Qty    25637164329 HC PT MANUAL THERAPY EA 15 MIN 7/6/2017 Chastity Marie PTA GP 2    13072951446 HC PT THER PROC EA 15 MIN 7/6/2017 Chastity Marie PTA GP 1    06938337687 HC PT HOT OR COLD PACK TREAT [facility] 7/6/2017 Chastity Marie PTA GP 1                    Chastity Marie PTA  7/6/2017

## 2017-07-12 ENCOUNTER — HOSPITAL ENCOUNTER (OUTPATIENT)
Dept: PHYSICAL THERAPY | Facility: HOSPITAL | Age: 82
Setting detail: THERAPIES SERIES
Discharge: HOME | End: 2017-07-12

## 2017-07-12 DIAGNOSIS — Z98.890 S/P ORIF (OPEN REDUCTION INTERNAL FIXATION) FRACTURE: Primary | ICD-10-CM

## 2017-07-12 DIAGNOSIS — Z87.81 S/P ORIF (OPEN REDUCTION INTERNAL FIXATION) FRACTURE: Primary | ICD-10-CM

## 2017-07-12 PROCEDURE — 97140 MANUAL THERAPY 1/> REGIONS: CPT

## 2017-07-12 PROCEDURE — 97110 THERAPEUTIC EXERCISES: CPT

## 2017-07-12 NOTE — THERAPY TREATMENT NOTE
Outpatient Physical Therapy Ortho Treatment Note  [facility]     Patient Name: Amaury Moulton  : 1935  MRN: [id number]  Today's Date: 2017      Visit Date: 2017    Visit Dx:    ICD-10-CM ICD-9-CM   1. S/P ORIF (open reduction internal fixation) fracture Z96.7 V45.89    Z87.81 V15.51       Patient Active Problem List   Diagnosis   • HLD (hyperlipidemia)   • Hernia, inguinal, left   • Edema, peripheral   • Skin growth   • SBO (small bowel obstruction)   • Bradycardia        Past Medical History:   Diagnosis Date   • Arrhythmia    • Atherosclerotic vascular disease    • Benign prostatic hypertrophy    • Bladder calculus     SEES DR. MOYA - UROL   • Bradycardia    • Cardiomegaly    • History of hepatitis     AGE 21   • Hyperlipidemia    • Peripheral edema    • Weakness         Past Surgical History:   Procedure Laterality Date   • CARDIAC PACEMAKER PLACEMENT      Octoberâ€“2014â€“Dr. Allison and associates.   • CATARACT EXTRACTION     • COLONOSCOPY      €“normalâ€“Dr. Ahmet Harvey (surgery)   • HERNIA REPAIR      €“Dr. Ahmet Johnson€“patient had surgery for a left inguinal hernia however at the time of surgery no hernia was found either internally or externally according to the surgical report.   • TOTAL SHOULDER ARTHROPLASTY W/ DISTAL CLAVICLE EXCISION Right 5/15/2017    Procedure: Open Reduction and Internal Fixation Proximal Humerus;  Surgeon: Hugo Way MD;  Location: [facility];  Service:              PT Ortho       17 1200    Subjective Comments    Subjective Comments pain with activity , not at rest.  His wife said he pulled weeds and came in from that sore and went to bed for 2 hours.  -SI    Subjective Pain    Able to rate subjective pain? yes  -SI    Pre-Treatment Pain Level 0  -SI    Post-Treatment Pain Level 2  -SI    Subjective Pain Comment pain end ranges with stretching  -SI    Right Shoulder    Flexion ROM Details P-130  -SI     ABduction ROM Details P-100  -SI    External Rotation ROM Details P-60  -SI    Internal Rotation ROM Details P-80  -SI      User Key  (r) = Recorded By, (t) = Taken By, (c) = Cosigned By    Initials Name Provider Type    DEVAN Chastity JAVIER Marie PTA Physical Therapy Assistant                            PT Assessment/Plan       07/12/17 1234       PT Assessment    Assessment Comments Severe thaoracic kyphosis.  His wife is more motivated for him to exercises than he is, but seems willing to do HEP 1-2 times a day.   -SI       User Key  (r) = Recorded By, (t) = Taken By, (c) = Cosigned By    Initials Name Provider Type    DEVAN Chastityfabian Marie PTA Physical Therapy Assistant                Modalities       07/12/17 1200          Ice    Patient reports will apply ice at home to involved area Yes  -SI        User Key  (r) = Recorded By, (t) = Taken By, (c) = Cosigned By    Initials Name Provider Type    DEVAN Marie PTA Physical Therapy Assistant                Exercises       07/12/17 1200          Subjective Comments    Subjective Comments pain with activity , not at rest.  His wife said he pulled weeds and came in from that sore and went to bed for 2 hours.  -SI      Subjective Pain    Able to rate subjective pain? yes  -SI      Pre-Treatment Pain Level 0  -SI      Post-Treatment Pain Level 2  -SI      Subjective Pain Comment pain end ranges with stretching  -SI        User Key  (r) = Recorded By, (t) = Taken By, (c) = Cosigned By    Initials Name Provider Type    DEVAN Marie PTA Physical Therapy Assistant                        Manual Rx (last 36 hours)      Manual Treatments       07/12/17 1100          Manual Rx 1    Manual Rx 1 Location R shoulder  -SI      Manual Rx 1 Type ROM  -SI      Manual Rx 1 Duration 25  -SI        User Key  (r) = Recorded By, (t) = Taken By, (c) = Cosigned By    Initials Name Provider Type    DEVAN Marie PTA Physical Therapy Assistant                    Therapy Education        07/12/17 1234          Therapy Education    Given HEP;Symptoms/condition management  -SI      Program Reinforced   still needs lot of re-instruct for the basic ROM exercises  -SI      How Provided Verbal;Demonstration;Written  -SI      Provided to Patient  -SI      Level of Understanding Teach back education performed  -SI        User Key  (r) = Recorded By, (t) = Taken By, (c) = Cosigned By    Initials Name Provider Type    DEVAN Marie PTA Physical Therapy Assistant                Time Calculation:   Start Time: 1145  Stop Time: 1230  Time Calculation (min): 45 min    Therapy Charges for Today     Code Description Service Date Service Provider Modifiers Qty    29885121831 HC PT MANUAL THERAPY EA 15 MIN 7/12/2017 Chastity Marie PTA GP 2    45327026784 HC PT THER PROC EA 15 MIN 7/12/2017 Chastity Marie PTA GP 1                    Chastity Marie PTA  7/12/2017

## 2017-07-13 ENCOUNTER — APPOINTMENT (OUTPATIENT)
Dept: PHYSICAL THERAPY | Facility: HOSPITAL | Age: 82
End: 2017-07-13

## 2017-07-14 ENCOUNTER — HOSPITAL ENCOUNTER (OUTPATIENT)
Dept: PHYSICAL THERAPY | Facility: HOSPITAL | Age: 82
Setting detail: THERAPIES SERIES
Discharge: HOME | End: 2017-07-14

## 2017-07-14 DIAGNOSIS — Z87.81 S/P ORIF (OPEN REDUCTION INTERNAL FIXATION) FRACTURE: Primary | ICD-10-CM

## 2017-07-14 DIAGNOSIS — Z98.890 S/P ORIF (OPEN REDUCTION INTERNAL FIXATION) FRACTURE: Primary | ICD-10-CM

## 2017-07-14 PROCEDURE — 97140 MANUAL THERAPY 1/> REGIONS: CPT

## 2017-07-14 PROCEDURE — 97110 THERAPEUTIC EXERCISES: CPT

## 2017-07-14 NOTE — THERAPY TREATMENT NOTE
Outpatient Physical Therapy Ortho Treatment Note  [facility]     Patient Name: Amaury Moulton  : 1935  MRN: [id number]  Today's Date: 2017      Visit Date: 2017    Visit Dx:    ICD-10-CM ICD-9-CM   1. S/P ORIF (open reduction internal fixation) fracture Z96.7 V45.89    Z87.81 V15.51       Patient Active Problem List   Diagnosis   • HLD (hyperlipidemia)   • Hernia, inguinal, left   • Edema, peripheral   • Skin growth   • SBO (small bowel obstruction)   • Bradycardia        Past Medical History:   Diagnosis Date   • Arrhythmia    • Atherosclerotic vascular disease    • Benign prostatic hypertrophy    • Bladder calculus     SEES DR. MOYA - UROL   • Bradycardia    • Cardiomegaly    • History of hepatitis     AGE 21   • Hyperlipidemia    • Peripheral edema    • Weakness         Past Surgical History:   Procedure Laterality Date   • CARDIAC PACEMAKER PLACEMENT      Octoberâ€“2014â€“Dr. Allison and associates.   • CATARACT EXTRACTION     • COLONOSCOPY      €“normalâ€“Dr. Ahmet Harvey (surgery)   • HERNIA REPAIR      €“Dr. Ahmet Johnson€“patient had surgery for a left inguinal hernia however at the time of surgery no hernia was found either internally or externally according to the surgical report.   • TOTAL SHOULDER ARTHROPLASTY W/ DISTAL CLAVICLE EXCISION Right 5/15/2017    Procedure: Open Reduction and Internal Fixation Proximal Humerus;  Surgeon: Hugo Way MD;  Location: [facility];  Service:              PT Ortho       17 1200    Subjective Comments    Subjective Comments Min to no pain at rest  -SI    Subjective Pain    Able to rate subjective pain? yes  -SI    Pre-Treatment Pain Level 0  -SI    Post-Treatment Pain Level 2  -SI    Posture/Observations    Alignment Options Thoracic kyphosis;Rounded shoulders   severe  -SI    Left Shoulder    Flexion ROM Details A-12o  -SI    ABduction ROM Details A-100  -SI    External Rotation ROM Details A-C7  -SI     Internal Rotation ROM Details A-T10  -SI    Right Shoulder    Flexion ROM Details A-95  -SI    ABduction ROM Details A-72  -SI    External Rotation ROM Details A-C7  -SI    Internal Rotation ROM Details A-T10  -SI    Right Shoulder    Flexion Gross Movement (3/5) fair  -SI    ABduction Gross Movement (3/5) fair  -SI    Int Rotation Gross Movement (3/5) fair  -SI    Ext Rotation Gross Movement (3/5) fair  -SI      07/12/17 1200    Subjective Comments    Subjective Comments pain with activity , not at rest.  His wife said he pulled weeds and came in from that sore and went to bed for 2 hours.  -SI    Subjective Pain    Able to rate subjective pain? yes  -SI    Pre-Treatment Pain Level 0  -SI    Post-Treatment Pain Level 2  -SI    Subjective Pain Comment pain end ranges with stretching  -SI    Right Shoulder    Flexion ROM Details P-130  -SI    ABduction ROM Details P-100  -SI    External Rotation ROM Details P-60  -SI    Internal Rotation ROM Details P-80  -SI      User Key  (r) = Recorded By, (t) = Taken By, (c) = Cosigned By    Initials Name Provider Type    DEVAN Marie PTA Physical Therapy Assistant                            PT Assessment/Plan       07/14/17 1237       PT Assessment    Assessment Comments pt with sever kyphosis and limited ROM left shoulder as well.  Goal is for symetrical P-AROM.  -SI       User Key  (r) = Recorded By, (t) = Taken By, (c) = Cosigned By    Initials Name Provider Type    DEVAN Marie PTA Physical Therapy Assistant                    Exercises       07/14/17 1200          Subjective Comments    Subjective Comments Min to no pain at rest  -SI      Subjective Pain    Able to rate subjective pain? yes  -SI      Pre-Treatment Pain Level 0  -SI      Post-Treatment Pain Level 2  -SI      Exercise 1    Exercise Name 1 see exercise flowsheet  -SI        User Key  (r) = Recorded By, (t) = Taken By, (c) = Cosigned By    Initials Name Provider Type    DEVAN Marie PTA  Physical Therapy Assistant                        Manual Rx (last 36 hours)      Manual Treatments       07/14/17 1100          Manual Rx 1    Manual Rx 1 Location R shoulder  -SI      Manual Rx 1 Type ROM  -SI      Manual Rx 1 Duration 25  -SI        User Key  (r) = Recorded By, (t) = Taken By, (c) = Cosigned By    Initials Name Provider Type    DEVAN Marie PTA Physical Therapy Assistant                PT OP Goals       07/14/17 1200       PT Short Term Goals    STG 1 Pt will be independent with HEP to promote R shoulder strengthening and ROM to allow full return to functional use of R shoulder  -SI     STG 1 Progress Ongoing  -SI     STG 2 Pt will demonstrate R shoulder AROM flex 125, , ER C7, IR T10  -SI     STG 2 Progress Ongoing  -SI       User Key  (r) = Recorded By, (t) = Taken By, (c) = Cosigned By    Initials Name Provider Type    DEVAN Marie PTA Physical Therapy Assistant                Therapy Education       07/14/17 1237          Therapy Education    Given HEP;Symptoms/condition management  -SI      Program Progressed  -SI      How Provided Verbal;Demonstration;Written  -SI      Provided to Patient  -SI      Level of Understanding Teach back education performed  -SI        User Key  (r) = Recorded By, (t) = Taken By, (c) = Cosigned By    Initials Name Provider Type    DEVAN Marie PTA Physical Therapy Assistant                Time Calculation:   Start Time: 1145  Stop Time: 1230  Time Calculation (min): 45 min    Therapy Charges for Today     Code Description Service Date Service Provider Modifiers Qty    55953456991 HC PT MANUAL THERAPY EA 15 MIN 7/14/2017 Chastity Marie PTA GP 2    77248943897 HC PT THER PROC EA 15 MIN 7/14/2017 Chastity Marie PTA GP 1                    Chastity Marie PTA  7/14/2017

## 2017-07-18 ENCOUNTER — HOSPITAL ENCOUNTER (OUTPATIENT)
Dept: PHYSICAL THERAPY | Facility: HOSPITAL | Age: 82
Setting detail: THERAPIES SERIES
Discharge: HOME | End: 2017-07-18

## 2017-07-18 DIAGNOSIS — Z87.81 S/P ORIF (OPEN REDUCTION INTERNAL FIXATION) FRACTURE: Primary | ICD-10-CM

## 2017-07-18 DIAGNOSIS — Z98.890 S/P ORIF (OPEN REDUCTION INTERNAL FIXATION) FRACTURE: Primary | ICD-10-CM

## 2017-07-18 PROCEDURE — 97110 THERAPEUTIC EXERCISES: CPT

## 2017-07-18 PROCEDURE — 97140 MANUAL THERAPY 1/> REGIONS: CPT

## 2017-07-18 NOTE — THERAPY TREATMENT NOTE
Outpatient Physical Therapy Ortho Treatment Note  [facility]     Patient Name: Amaury Moulton  : 1935  MRN: [id number]  Today's Date: 2017      Visit Date: 2017    Visit Dx:    ICD-10-CM ICD-9-CM   1. S/P ORIF (open reduction internal fixation) fracture Z96.7 V45.89    Z87.81 V15.51       Patient Active Problem List   Diagnosis   • HLD (hyperlipidemia)   • Hernia, inguinal, left   • Edema, peripheral   • Skin growth   • SBO (small bowel obstruction)   • Bradycardia        Past Medical History:   Diagnosis Date   • Arrhythmia    • Atherosclerotic vascular disease    • Benign prostatic hypertrophy    • Bladder calculus     SEES DR. MOYA - UROL   • Bradycardia    • Cardiomegaly    • History of hepatitis     AGE 21   • Hyperlipidemia    • Peripheral edema    • Weakness         Past Surgical History:   Procedure Laterality Date   • CARDIAC PACEMAKER PLACEMENT      Octoberâ€“2014â€“Dr. Allison and associates.   • CATARACT EXTRACTION     • COLONOSCOPY      €“normalâ€“Dr. Ahmet Harvey (surgery)   • HERNIA REPAIR      €“Dr. Ahmet Johnson€“patient had surgery for a left inguinal hernia however at the time of surgery no hernia was found either internally or externally according to the surgical report.   • TOTAL SHOULDER ARTHROPLASTY W/ DISTAL CLAVICLE EXCISION Right 5/15/2017    Procedure: Open Reduction and Internal Fixation Proximal Humerus;  Surgeon: Hugo Way MD;  Location: [facility];  Service:              PT Ortho       17 1200    Subjective Comments    Subjective Comments No new complaints  -SI    Subjective Pain    Able to rate subjective pain? yes  -SI    Pre-Treatment Pain Level 0  -SI    Post-Treatment Pain Level 0  -SI    Subjective Pain Comment pain right shoulder 6 at worst  -SI    Left Shoulder    Flexion ROM Details P-145  -SI    External Rotation ROM Details P-65  -SI    Right Shoulder    Flexion ROM Details P-129  -SI    External Rotation  ROM Details P-60  -SI      User Key  (r) = Recorded By, (t) = Taken By, (c) = Cosigned By    Initials Name Provider Type    DEVAN Marie PTA Physical Therapy Assistant                            PT Assessment/Plan       07/18/17 1231       PT Assessment    Assessment Comments Good effort in PT, just moves slowly and generally rigid posture.  -SI       User Key  (r) = Recorded By, (t) = Taken By, (c) = Cosigned By    Initials Name Provider Type    DEVAN Marie PTA Physical Therapy Assistant                    Exercises       07/18/17 1200          Subjective Comments    Subjective Comments No new complaints  -SI      Subjective Pain    Able to rate subjective pain? yes  -SI      Pre-Treatment Pain Level 0  -SI      Post-Treatment Pain Level 0  -SI      Subjective Pain Comment pain right shoulder 6 at worst  -SI      Exercise 1    Exercise Name 1 see exercise flowsheet  -SI        User Key  (r) = Recorded By, (t) = Taken By, (c) = Cosigned By    Initials Name Provider Type    DEVAN Marie PTA Physical Therapy Assistant                        Manual Rx (last 36 hours)      Manual Treatments       07/18/17 1100          Manual Rx 1    Manual Rx 1 Location R shoulder  -SI      Manual Rx 1 Type ROM  -SI      Manual Rx 1 Duration 25  -SI        User Key  (r) = Recorded By, (t) = Taken By, (c) = Cosigned By    Initials Name Provider Type    DEVAN Marie PTA Physical Therapy Assistant                    Therapy Education       07/18/17 1231          Therapy Education    Given HEP;Symptoms/condition management  -SI      Program Reinforced  -SI      How Provided Verbal;Demonstration;Written  -SI      Provided to Patient  -SI        User Key  (r) = Recorded By, (t) = Taken By, (c) = Cosigned By    Initials Name Provider Type    DEVAN Marie PTA Physical Therapy Assistant                Time Calculation:   Start Time: 1145  Stop Time: 1232  Time Calculation (min): 47 min    Therapy Charges for Today      Code Description Service Date Service Provider Modifiers Qty    18192164783 HC PT THER PROC EA 15 MIN 7/18/2017 Chastity Marie, EMMA GP 1    60614903874 HC PT MANUAL THERAPY EA 15 MIN 7/18/2017 Chastity Marie PTA GP 2                    Chastity Marie, EMMA  7/18/2017

## 2017-07-20 ENCOUNTER — HOSPITAL ENCOUNTER (OUTPATIENT)
Dept: PHYSICAL THERAPY | Facility: HOSPITAL | Age: 82
Setting detail: THERAPIES SERIES
Discharge: HOME | End: 2017-07-20

## 2017-07-20 DIAGNOSIS — Z87.81 S/P ORIF (OPEN REDUCTION INTERNAL FIXATION) FRACTURE: Primary | ICD-10-CM

## 2017-07-20 DIAGNOSIS — Z98.890 S/P ORIF (OPEN REDUCTION INTERNAL FIXATION) FRACTURE: Primary | ICD-10-CM

## 2017-07-20 PROCEDURE — 97110 THERAPEUTIC EXERCISES: CPT

## 2017-07-20 PROCEDURE — 97140 MANUAL THERAPY 1/> REGIONS: CPT

## 2017-07-20 NOTE — THERAPY TREATMENT NOTE
"    Outpatient Physical Therapy Ortho Treatment Note  [facility]     Patient Name: Amaury Moulton  : 1935  MRN: [id number]  Today's Date: 2017      Visit Date: 2017    Visit Dx:    ICD-10-CM ICD-9-CM   1. S/P ORIF (open reduction internal fixation) fracture Z96.7 V45.89    Z87.81 V15.51       Patient Active Problem List   Diagnosis   • HLD (hyperlipidemia)   • Hernia, inguinal, left   • Edema, peripheral   • Skin growth   • SBO (small bowel obstruction)   • Bradycardia        Past Medical History:   Diagnosis Date   • Arrhythmia    • Atherosclerotic vascular disease    • Benign prostatic hypertrophy    • Bladder calculus     SEES DR. MOYA - UROL   • Bradycardia    • Cardiomegaly    • History of hepatitis     AGE 21   • Hyperlipidemia    • Peripheral edema    • Weakness         Past Surgical History:   Procedure Laterality Date   • CARDIAC PACEMAKER PLACEMENT      Octoberâ€“2014â€“Dr. Allison and associates.   • CATARACT EXTRACTION     • COLONOSCOPY      €“normalâ€“Dr. Ahmet Harvey (surgery)   • HERNIA REPAIR      €“Dr. Ahmet Johnson€“patient had surgery for a left inguinal hernia however at the time of surgery no hernia was found either internally or externally according to the surgical report.   • TOTAL SHOULDER ARTHROPLASTY W/ DISTAL CLAVICLE EXCISION Right 5/15/2017    Procedure: Open Reduction and Internal Fixation Proximal Humerus;  Surgeon: Hugo Way MD;  Location: [facility];  Service:              PT Ortho       17 1200    Subjective Comments    Subjective Comments \"can't reach to top shelf of refrigerator yet....  -SI    Subjective Pain    Able to rate subjective pain? yes  -SI    Pre-Treatment Pain Level 0  -SI    Post-Treatment Pain Level 0  -SI    Subjective Pain Comment Pain end ranges with A and PROM  -SI    Left Shoulder    Flexion ROM Details A-110  -SI    ABduction ROM Details A-120  -SI    External Rotation ROM Details A-T1  -SI    " "Internal Rotation ROM Details A-T10  -SI    Right Shoulder    Flexion ROM Details A-90  -SI    ABduction ROM Details A-85  -SI    External Rotation ROM Details A-T1  -SI    Internal Rotation ROM Details A-T10  -SI    Right Shoulder    Flexion Gross Movement (3/5) fair  -SI    ABduction Gross Movement (3/5) fair  -SI    Int Rotation Gross Movement (3/5) fair  -SI    Ext Rotation Gross Movement (3/5) fair  -SI      07/18/17 1200    Subjective Comments    Subjective Comments No new complaints  -SI    Subjective Pain    Able to rate subjective pain? yes  -SI    Pre-Treatment Pain Level 0  -SI    Post-Treatment Pain Level 0  -SI    Subjective Pain Comment pain right shoulder 6 at worst  -SI    Left Shoulder    Flexion ROM Details P-145  -SI    External Rotation ROM Details P-65  -SI    Right Shoulder    Flexion ROM Details P-129  -SI    External Rotation ROM Details P-60  -SI      User Key  (r) = Recorded By, (t) = Taken By, (c) = Cosigned By    Initials Name Provider Type    DEVAN Marie PTA Physical Therapy Assistant                            PT Assessment/Plan       07/20/17 1233       PT Assessment    Assessment Comments Encouraging more general use RUE with ADL's.  AROM limited both shoulder due to extreme kypohosis and forward head posture.  Goal is for symetrical AROM and in progress  -SI       User Key  (r) = Recorded By, (t) = Taken By, (c) = Cosigned By    Initials Name Provider Type    DEVAN Marie PTA Physical Therapy Assistant                    Exercises       07/20/17 1200          Subjective Comments    Subjective Comments \"can't reach to top shelf of refrigerator yet....  -SI      Subjective Pain    Able to rate subjective pain? yes  -SI      Pre-Treatment Pain Level 0  -SI      Post-Treatment Pain Level 0  -SI      Subjective Pain Comment Pain end ranges with A and PROM  -SI      Exercise 1    Exercise Name 1 see exercise flowsheet  -SI        User Key  (r) = Recorded By, (t) = Taken By, " (c) = Cosigned By    Initials Name Provider Type    DEVAN Marie PTA Physical Therapy Assistant                        Manual Rx (last 36 hours)      Manual Treatments       07/20/17 1100          Manual Rx 1    Manual Rx 1 Location R shoulder  -SI      Manual Rx 1 Duration 25  -SI        User Key  (r) = Recorded By, (t) = Taken By, (c) = Cosigned By    Initials Name Provider Type    DEVAN Marie PTA Physical Therapy Assistant                    Therapy Education       07/20/17 1233          Therapy Education    Given HEP;Symptoms/condition management  -SI      Program Progressed  -SI      How Provided Verbal;Demonstration;Written  -SI      Provided to Patient  -SI      Level of Understanding Teach back education performed  -SI        User Key  (r) = Recorded By, (t) = Taken By, (c) = Cosigned By    Initials Name Provider Type    DEVAN Marie PTA Physical Therapy Assistant                Time Calculation:   Start Time: 1145  Stop Time: 1234  Time Calculation (min): 49 min    Therapy Charges for Today     Code Description Service Date Service Provider Modifiers Qty    05146787722 HC PT MANUAL THERAPY EA 15 MIN 7/20/2017 Chastity Marie PTA GP 2    23315217748 HC PT THER PROC EA 15 MIN 7/20/2017 Chastity Marie PTA GP 1                    Chastity Marie PTA  7/20/2017

## 2017-07-27 ENCOUNTER — HOSPITAL ENCOUNTER (OUTPATIENT)
Dept: PHYSICAL THERAPY | Facility: HOSPITAL | Age: 82
Setting detail: THERAPIES SERIES
Discharge: HOME | End: 2017-07-27

## 2017-07-27 DIAGNOSIS — Z98.890 S/P ORIF (OPEN REDUCTION INTERNAL FIXATION) FRACTURE: Primary | ICD-10-CM

## 2017-07-27 DIAGNOSIS — Z87.81 S/P ORIF (OPEN REDUCTION INTERNAL FIXATION) FRACTURE: Primary | ICD-10-CM

## 2017-07-27 PROCEDURE — 97110 THERAPEUTIC EXERCISES: CPT

## 2017-07-27 PROCEDURE — 97140 MANUAL THERAPY 1/> REGIONS: CPT

## 2017-07-27 NOTE — THERAPY TREATMENT NOTE
Outpatient Physical Therapy Ortho Treatment Note  [facility]     Patient Name: Amaury Moulton  : 1935  MRN: [id number]  Today's Date: 2017      Visit Date: 2017    Visit Dx:    ICD-10-CM ICD-9-CM   1. S/P ORIF (open reduction internal fixation) fracture Z96.7 V45.89    Z87.81 V15.51       Patient Active Problem List   Diagnosis   • HLD (hyperlipidemia)   • Hernia, inguinal, left   • Edema, peripheral   • Skin growth   • SBO (small bowel obstruction)   • Bradycardia        Past Medical History:   Diagnosis Date   • Arrhythmia    • Atherosclerotic vascular disease    • Benign prostatic hypertrophy    • Bladder calculus     SEES DR. MOYA - UROL   • Bradycardia    • Cardiomegaly    • History of hepatitis     AGE 21   • Hyperlipidemia    • Peripheral edema    • Weakness         Past Surgical History:   Procedure Laterality Date   • CARDIAC PACEMAKER PLACEMENT      Octoberâ€“2014â€“Dr. Allison and associates.   • CATARACT EXTRACTION     • COLONOSCOPY      €“normalâ€“Dr. Ahmet Harvey (surgery)   • HERNIA REPAIR      €“Dr. Ahmet Johnson€“patient had surgery for a left inguinal hernia however at the time of surgery no hernia was found either internally or externally according to the surgical report.   • TOTAL SHOULDER ARTHROPLASTY W/ DISTAL CLAVICLE EXCISION Right 5/15/2017    Procedure: Open Reduction and Internal Fixation Proximal Humerus;  Surgeon: Hugo Way MD;  [facility];  Service:              PT Ortho       17 1300    Subjective Comments    Subjective Comments sub-acute pain right shoulder reported with reaching activites.  -SI    Subjective Pain    Able to rate subjective pain? yes  -SI    Pre-Treatment Pain Level 0  -SI    Post-Treatment Pain Level 0  -SI    Subjective Pain Comment pain 4-6 at times right shoulder  -SI    Posture/Observations    Alignment Options Thoracic kyphosis  -SI    Thoracic Kyphosis Severe  -SI    Rounded  Shoulders Severe  -SI    Left Shoulder    Flexion ROM Details A/P 115/150  -SI    ABduction ROM Details 105/142  -SI    External Rotation ROM Details T1/74  -SI    Internal Rotation ROM Details T10/80  -SI    Right Shoulder    Flexion ROM Details A/P 100/140  -SI    ABduction ROM Details 90/110  -SI    External Rotation ROM Details T1/62  -SI    Internal Rotation ROM Details T10/80  -SI    Right Shoulder    Flexion Gross Movement (3/5) fair  -SI    ABduction Gross Movement (3/5) fair  -SI    Int Rotation Gross Movement (3/5) fair  -SI    Ext Rotation Gross Movement (3/5) fair  -SI      User Key  (r) = Recorded By, (t) = Taken By, (c) = Cosigned By    Initials Name Provider Type    DEVAN Marie PTA Physical Therapy Assistant                            PT Assessment/Plan       07/27/17 1314       PT Assessment    Assessment Comments Admits to fair compliance with HEP.  Severe postural changes and both shoulder with limited ROM.  Goal for symetrical A/PROM.  -SI       User Key  (r) = Recorded By, (t) = Taken By, (c) = Cosigned By    Initials Name Provider Type    DEVAN Marie PTA Physical Therapy Assistant                    Exercises       07/27/17 1300          Subjective Comments    Subjective Comments sub-acute pain right shoulder reported with reaching activites.  -SI      Subjective Pain    Able to rate subjective pain? yes  -SI      Pre-Treatment Pain Level 0  -SI      Post-Treatment Pain Level 0  -SI      Subjective Pain Comment pain 4-6 at times right shoulder  -SI      Exercise 1    Exercise Name 1 see exercise flowsheet  -SI        User Key  (r) = Recorded By, (t) = Taken By, (c) = Cosigned By    Initials Name Provider Type    DEVAN Marie PTA Physical Therapy Assistant                        Manual Rx (last 36 hours)      Manual Treatments       07/27/17 1300          Manual Rx 1    Manual Rx 1 Location R shoulder  -SI      Manual Rx 1 Duration 25  -SI        User Key  (r) = Recorded By,  (t) = Taken By, (c) = Cosigned By    Initials Name Provider Type    DEVAN Marie PTA Physical Therapy Assistant                PT OP Goals       07/27/17 1300       PT Short Term Goals    STG 1 Pt will be independent with HEP to promote R shoulder strengthening and ROM to allow full return to functional use of R shoulder  -SI     STG 1 Progress Ongoing  -SI     STG 2 Pt will demonstrate R shoulder AROM flex 125, , ER C7, IR T10  -SI     STG 2 Progress Ongoing  -SI     STG 3 Pt will demonstrate R shoulder MMT 3/5  -SI     STG 3 Progress Met  -SI     Long Term Goals    LTG 1 Pt will demonstrate R shoulder MMT 4/5  -SI     LTG 1 Progress Ongoing  -SI       User Key  (r) = Recorded By, (t) = Taken By, (c) = Cosigned By    Initials Name Provider Type    DEVAN Marie PTA Physical Therapy Assistant                Therapy Education       07/27/17 1312          Therapy Education    Given HEP;Symptoms/condition management  -SI      Program Reinforced  -SI      How Provided Verbal;Demonstration;Written  -SI      Provided to Patient  -SI      Level of Understanding Teach back education performed  -SI        User Key  (r) = Recorded By, (t) = Taken By, (c) = Cosigned By    Initials Name Provider Type    DEVAN Marie PTA Physical Therapy Assistant                Time Calculation:   Start Time: 1145  Stop Time: 1230  Time Calculation (min): 45 min    Therapy Charges for Today     Code Description Service Date Service Provider Modifiers Qty    52807715656 HC PT MANUAL THERAPY EA 15 MIN 7/27/2017 Chastity Marie PTA GP 2    44930180490 HC PT THER PROC EA 15 MIN 7/27/2017 Chastity Marie PTA GP 1                    Chastity Marie PTA  7/27/2017

## 2017-08-02 ENCOUNTER — HOSPITAL ENCOUNTER (OUTPATIENT)
Dept: PHYSICAL THERAPY | Facility: HOSPITAL | Age: 82
Setting detail: THERAPIES SERIES
Discharge: HOME | End: 2017-08-02

## 2017-08-02 DIAGNOSIS — Z87.81 S/P ORIF (OPEN REDUCTION INTERNAL FIXATION) FRACTURE: Primary | ICD-10-CM

## 2017-08-02 DIAGNOSIS — Z98.890 S/P ORIF (OPEN REDUCTION INTERNAL FIXATION) FRACTURE: Primary | ICD-10-CM

## 2017-08-02 PROCEDURE — 97140 MANUAL THERAPY 1/> REGIONS: CPT | Performed by: PHYSICAL THERAPIST

## 2017-08-02 PROCEDURE — 97110 THERAPEUTIC EXERCISES: CPT | Performed by: PHYSICAL THERAPIST

## 2017-08-03 ENCOUNTER — DOCUMENTATION (OUTPATIENT)
Dept: PHYSICAL THERAPY | Facility: HOSPITAL | Age: 82
End: 2017-08-03

## 2017-08-03 NOTE — THERAPY RE-EVALUATION
Outpatient Physical Therapy Ortho Re-Evaluation  [facility]     Patient Name: Amaury Moulton  : 1935  MRN: [id number]  Today's Date: 8/3/2017      Visit Date: 2017    Patient Active Problem List   Diagnosis   • HLD (hyperlipidemia)   • Hernia, inguinal, left   • Edema, peripheral   • Skin growth   • SBO (small bowel obstruction)   • Bradycardia        Past Medical History:   Diagnosis Date   • Arrhythmia    • Atherosclerotic vascular disease    • Benign prostatic hypertrophy    • Bladder calculus     SEES DR. MOYA - UROL   • Bradycardia    • Cardiomegaly    • History of hepatitis     AGE 21   • Hyperlipidemia    • Peripheral edema    • Weakness         Past Surgical History:   Procedure Laterality Date   • CARDIAC PACEMAKER PLACEMENT      Octoberâ€“2014â€“Dr. Allison and associates.   • CATARACT EXTRACTION     • COLONOSCOPY      €“normalâ€“Dr. Ahmet Harvey (surgery)   • HERNIA REPAIR      €“Dr. Ahmet Johnson€“patient had surgery for a left inguinal hernia however at the time of surgery no hernia was found either internally or externally according to the surgical report.   • TOTAL SHOULDER ARTHROPLASTY W/ DISTAL CLAVICLE EXCISION Right 5/15/2017    Procedure: Open Reduction and Internal Fixation Proximal Humerus;  Surgeon: Hugo Way MD;  Location: [facility];  Service:        Visit Dx:     ICD-10-CM ICD-9-CM   1. S/P ORIF (open reduction internal fixation) fracture Z96.7 V45.89    Z87.81 V15.51                 PT Ortho       17 1200    Subjective Comments    Subjective Comments sub-acute pain reported with reaching activites  -SI    Subjective Pain    Able to rate subjective pain? yes  -SI    Pre-Treatment Pain Level 0  -SI    Post-Treatment Pain Level 0  -SI    Subjective Pain Comment 4 with stretching and some ADL's when at its' worst  -SI    Posture/Observations    Alignment Options Thoracic kyphosis  -SI    Thoracic Kyphosis Severe  -SI    Rounded  Shoulders Severe  -SI    Left Shoulder    Flexion ROM Details A/P 115/150  -SI    ABduction ROM Details 105/142  -SI    External Rotation ROM Details T1/74  -SI    Internal Rotation ROM Details T10/80  -SI    Right Shoulder    Flexion ROM Details A/P 105/140  -SI    ABduction ROM Details 90/110  -SI    External Rotation ROM Details T1/62  -SI    Internal Rotation ROM Details T10/80  -SI    Right Shoulder    Flexion Gross Movement (3+/5) fair plus  -SI    ABduction Gross Movement (3/5) fair  -SI    Int Rotation Gross Movement (3/5) fair  -SI    Ext Rotation Gross Movement (3/5) fair  -SI      User Key  (r) = Recorded By, (t) = Taken By, (c) = Cosigned By    Initials Name Provider Type    DEVAN Marie PTA Physical Therapy Assistant                            Therapy Education       08/02/17 1236          Therapy Education    Given Symptoms/condition management;HEP  -SI      Program Progressed  -SI      How Provided Verbal;Demonstration;Written  -SI      Provided to Patient  -SI      Level of Understanding Teach back education performed  -SI        User Key  (r) = Recorded By, (t) = Taken By, (c) = Cosigned By    Initials Name Provider Type    DEVAN Marie PTA Physical Therapy Assistant                PT OP Goals       08/03/17 0900 08/02/17 1200    PT Short Term Goals    STG 1 Pt will be independent with HEP to promote R shoulder strengthening and ROM to allow full return to functional use of R shoulder  -LC Pt will be independent with HEP to promote R shoulder strengthening and ROM to allow full return to functional use of R shoulder  -SI    STG 1 Progress Ongoing  -LC Ongoing  -SI    STG 2 Pt will demonstrate R shoulder AROM flex 125, , ER C7, IR T10  -LC Pt will demonstrate R shoulder AROM flex 125, , ER C7, IR T10  -SI    STG 2 Progress Ongoing  -LC Ongoing  -SI    STG 3 Pt will demonstrate R shoulder MMT 3/5  -LC     STG 3 Progress Met  -LC     Long Term Goals    LTG 1 Pt will  demonstrate R shoulder MMT 4/5  - Pt will demonstrate R shoulder MMT 4/5  -SI    LTG 1 Progress Ongoing  -LC Ongoing  -SI    LTG 2 Pt will be able to lift 5 pounds overhead to place item on a shelf.  - Pt will be able to lift 5 pounds overhead to place item on a shelf.  -SI    LTG 2 Progress Ongoing  -LC Ongoing  -SI    Time Calculation    PT Goal Re-Cert Due Date 09/04/17  -       User Key  (r) = Recorded By, (t) = Taken By, (c) = Cosigned By    Initials Name Provider Type    DEVAN Marie, EMMA Physical Therapy Assistant    JAMES Carpenter, PT DPT Physical Therapist                PT Assessment/Plan       08/03/17 0937       PT Assessment    Functional Limitations Limitations in functional capacity and performance;Performance in self-care ADL;Performance in leisure activities;Limitations in community activities  -     Impairments Endurance;Impaired flexibility;Muscle strength;Pain;Joint mobility;Joint integrity;Range of motion;Motor function  -     Assessment Comments Pt has L shoulder AROM flex 115, , ER T1, IR T10. Pt has R shoulder AROM flex 105, ABD 90, ER T1, IR T10. He reports fair compliance with HEP. He will continue to be progressed to improve functional ROM and functional strength in B shoulders to allow for increased independence and decreased pain with activities at home.  -     PT Plan    PT Plan Comments Pt reuires continued skilled therapy to improve his functional use of his B shoulders for lifting, carrying, pushing, and pulling.  -       User Key  (r) = Recorded By, (t) = Taken By, (c) = Cosigned By    Initials Name Provider Type    JAMES Carpenter, PT DPT Physical Therapist                  Exercises       08/02/17 1200          Subjective Comments    Subjective Comments sub-acute pain reported with reaching activites  -SI      Subjective Pain    Able to rate subjective pain? yes  -SI      Pre-Treatment Pain Level 0  -SI      Post-Treatment Pain Level 0  -SI       Subjective Pain Comment 4 with stretching and some ADL's when at its' worst  -SI      Exercise 1    Exercise Name 1 see exercise flowsheet  -SI        User Key  (r) = Recorded By, (t) = Taken By, (c) = Cosigned By    Initials Name Provider Type    DEVAN Marie PTA Physical Therapy Assistant           Manual Rx (last 36 hours)      Manual Treatments       08/02/17 1100          Manual Rx 1    Manual Rx 1 Location R shoulder  -SI      Manual Rx 1 Duration 25  -SI        User Key  (r) = Recorded By, (t) = Taken By, (c) = Cosigned By    Initials Name Provider Type    DEVAN Marie PTA Physical Therapy Assistant                            Outcome Measures       08/02/17 1200          Functional Assessment    Outcome Measure Options Disabilities of the Arm, Shoulder, and Hand (DASH)   38%  -SI        User Key  (r) = Recorded By, (t) = Taken By, (c) = Cosigned By    Initials Name Provider Type    DEVAN Marie PTA Physical Therapy Assistant            Time Calculation:   Start Time: 1145  Stop Time: 1235  Time Calculation (min): 50 min         PT G-Codes  Outcome Measure Options: Disabilities of the Arm, Shoulder, and Hand (DASH) (38%)         Jj Carpenter, PT DPT  8/3/2017

## 2017-08-04 ENCOUNTER — HOSPITAL ENCOUNTER (OUTPATIENT)
Dept: PHYSICAL THERAPY | Facility: HOSPITAL | Age: 82
Setting detail: THERAPIES SERIES
Discharge: HOME | End: 2017-08-04

## 2017-08-04 DIAGNOSIS — Z98.890 S/P ORIF (OPEN REDUCTION INTERNAL FIXATION) FRACTURE: Primary | ICD-10-CM

## 2017-08-04 DIAGNOSIS — Z87.81 S/P ORIF (OPEN REDUCTION INTERNAL FIXATION) FRACTURE: Primary | ICD-10-CM

## 2017-08-04 PROCEDURE — 97140 MANUAL THERAPY 1/> REGIONS: CPT

## 2017-08-04 PROCEDURE — 97110 THERAPEUTIC EXERCISES: CPT

## 2017-08-04 NOTE — THERAPY TREATMENT NOTE
Outpatient Physical Therapy Ortho Treatment Note  [facility]     Patient Name: Amaury Moulton  : 1935  MRN: [id number]  Today's Date: 2017      Visit Date: 2017    Visit Dx:    ICD-10-CM ICD-9-CM   1. S/P ORIF (open reduction internal fixation) fracture Z96.7 V45.89    Z87.81 V15.51       Patient Active Problem List   Diagnosis   • HLD (hyperlipidemia)   • Hernia, inguinal, left   • Edema, peripheral   • Skin growth   • SBO (small bowel obstruction)   • Bradycardia        Past Medical History:   Diagnosis Date   • Arrhythmia    • Atherosclerotic vascular disease    • Benign prostatic hypertrophy    • Bladder calculus     SEES DR. MOYA - UROL   • Bradycardia    • Cardiomegaly    • History of hepatitis     AGE 21   • Hyperlipidemia    • Peripheral edema    • Weakness         Past Surgical History:   Procedure Laterality Date   • CARDIAC PACEMAKER PLACEMENT      Octoberâ€“2014â€“Dr. Allison and associates.   • CATARACT EXTRACTION     • COLONOSCOPY      €“normalâ€“Dr. Ahmet Harvey (surgery)   • HERNIA REPAIR      €“Dr. Ahmet Johnson€“patient had surgery for a left inguinal hernia however at the time of surgery no hernia was found either internally or externally according to the surgical report.   • TOTAL SHOULDER ARTHROPLASTY W/ DISTAL CLAVICLE EXCISION Right 5/15/2017    Procedure: Open Reduction and Internal Fixation Proximal Humerus;  Surgeon: Hugo Way MD;  Location: [facility];  Service:              PT Ortho       17 1700    Subjective Comments    Subjective Comments No new complaints  -SI    Subjective Pain    Able to rate subjective pain? yes  -SI    Pre-Treatment Pain Level 0  -SI    Post-Treatment Pain Level 0  -SI    Subjective Pain Comment 4 with stretching  -SI      17 1200    Subjective Comments    Subjective Comments sub-acute pain reported with reaching activites  -SI    Subjective Pain    Able to rate subjective pain? yes  -SI     Pre-Treatment Pain Level 0  -SI    Post-Treatment Pain Level 0  -SI    Subjective Pain Comment 4 with stretching and some ADL's when at its' worst  -SI    Posture/Observations    Alignment Options Thoracic kyphosis  -SI    Thoracic Kyphosis Severe  -SI    Rounded Shoulders Severe  -SI    Left Shoulder    Flexion ROM Details A/P 115/150  -SI    ABduction ROM Details 105/142  -SI    External Rotation ROM Details T1/74  -SI    Internal Rotation ROM Details T10/80  -SI    Right Shoulder    Flexion ROM Details A/P 105/140  -SI    ABduction ROM Details 90/110  -SI    External Rotation ROM Details T1/62  -SI    Internal Rotation ROM Details T10/80  -SI    Right Shoulder    Flexion Gross Movement (3+/5) fair plus  -SI    ABduction Gross Movement (3/5) fair  -SI    Int Rotation Gross Movement (3/5) fair  -SI    Ext Rotation Gross Movement (3/5) fair  -SI      User Key  (r) = Recorded By, (t) = Taken By, (c) = Cosigned By    Initials Name Provider Type    DEVAN Marie PTA Physical Therapy Assistant                            PT Assessment/Plan       08/04/17 1733       PT Assessment    Assessment Comments Encouraging pt to be oob more with general activity.  His wife says he is sedentary.  -SI       User Key  (r) = Recorded By, (t) = Taken By, (c) = Cosigned By    Initials Name Provider Type    DEVAN Marie PTA Physical Therapy Assistant                    Exercises       08/04/17 1700          Subjective Comments    Subjective Comments No new complaints  -SI      Subjective Pain    Able to rate subjective pain? yes  -SI      Pre-Treatment Pain Level 0  -SI      Post-Treatment Pain Level 0  -SI      Subjective Pain Comment 4 with stretching  -SI      Exercise 1    Exercise Name 1 see exercise flowsheet  -SI        User Key  (r) = Recorded By, (t) = Taken By, (c) = Cosigned By    Initials Name Provider Type    DEVAN Marie PTA Physical Therapy Assistant                        Manual Rx (last 36 hours)       Manual Treatments       08/04/17 1700          Manual Rx 1    Manual Rx 1 Location R shoulder  -SI      Manual Rx 1 Duration 25  -SI        User Key  (r) = Recorded By, (t) = Taken By, (c) = Cosigned By    Initials Name Provider Type    DEVAN Marie PTA Physical Therapy Assistant                    Therapy Education       08/04/17 1738          Therapy Education    Given HEP;Symptoms/condition management  -SI      Program Progressed  -SI      How Provided Verbal;Demonstration;Written  -SI      Provided to Patient  -SI      Level of Understanding Teach back education performed  -SI        User Key  (r) = Recorded By, (t) = Taken By, (c) = Cosigned By    Initials Name Provider Type    DEVAN Marie PTA Physical Therapy Assistant                Outcome Measures       08/02/17 1200          Functional Assessment    Outcome Measure Options Disabilities of the Arm, Shoulder, and Hand (DASH)   38%  -SI        User Key  (r) = Recorded By, (t) = Taken By, (c) = Cosigned By    Initials Name Provider Type    DEVAN Marie PTA Physical Therapy Assistant            Time Calculation:   Start Time: 1415  Stop Time: 1500  Time Calculation (min): 45 min    Therapy Charges for Today     Code Description Service Date Service Provider Modifiers Qty    50790345403 HC PT THER PROC EA 15 MIN 8/4/2017 Chastity Marie PTA GP 1    45534295855 HC PT MANUAL THERAPY EA 15 MIN 8/4/2017 Cahstity Marie PTA GP 2                    Chastity Marie PTA  8/4/2017

## 2017-08-07 ENCOUNTER — CLINICAL SUPPORT NO REQUIREMENTS (OUTPATIENT)
Dept: CARDIOLOGY | Facility: CLINIC | Age: 82
End: 2017-08-07

## 2017-08-07 DIAGNOSIS — I49.5 SICK SINUS SYNDROME (HCC): Primary | ICD-10-CM

## 2017-08-07 PROCEDURE — 93288 INTERROG EVL PM/LDLS PM IP: CPT | Performed by: INTERNAL MEDICINE

## 2017-08-09 ENCOUNTER — HOSPITAL ENCOUNTER (OUTPATIENT)
Dept: PHYSICAL THERAPY | Facility: HOSPITAL | Age: 82
Setting detail: THERAPIES SERIES
Discharge: HOME | End: 2017-08-09

## 2017-08-09 DIAGNOSIS — Z98.890 S/P ORIF (OPEN REDUCTION INTERNAL FIXATION) FRACTURE: Primary | ICD-10-CM

## 2017-08-09 DIAGNOSIS — Z87.81 S/P ORIF (OPEN REDUCTION INTERNAL FIXATION) FRACTURE: Primary | ICD-10-CM

## 2017-08-09 PROCEDURE — 97140 MANUAL THERAPY 1/> REGIONS: CPT

## 2017-08-09 PROCEDURE — 97110 THERAPEUTIC EXERCISES: CPT

## 2017-08-09 NOTE — THERAPY TREATMENT NOTE
"    Outpatient Physical Therapy Ortho Treatment Note  [facility]     Patient Name: Amaury Moulton  : 1935  MRN: [id number]  Today's Date: 2017      Visit Date: 2017    Visit Dx:    ICD-10-CM ICD-9-CM   1. S/P ORIF (open reduction internal fixation) fracture Z96.7 V45.89    Z87.81 V15.51       Patient Active Problem List   Diagnosis   • HLD (hyperlipidemia)   • Hernia, inguinal, left   • Edema, peripheral   • Skin growth   • SBO (small bowel obstruction)   • Bradycardia        Past Medical History:   Diagnosis Date   • Arrhythmia    • Atherosclerotic vascular disease    • Benign prostatic hypertrophy    • Bladder calculus     SEES DR. MOYA - UROL   • Bradycardia    • Cardiomegaly    • History of hepatitis     AGE 21   • Hyperlipidemia    • Peripheral edema    • Weakness         Past Surgical History:   Procedure Laterality Date   • CARDIAC PACEMAKER PLACEMENT      Octoberâ€“2014â€“Dr. Allison and associates.   • CATARACT EXTRACTION     • COLONOSCOPY      €“normalâ€“Dr. Ahmet Harvey (surgery)   • HERNIA REPAIR      €“Dr. Ahmet Johnson€“patient had surgery for a left inguinal hernia however at the time of surgery no hernia was found either internally or externally according to the surgical report.   • TOTAL SHOULDER ARTHROPLASTY W/ DISTAL CLAVICLE EXCISION Right 5/15/2017    Procedure: Open Reduction and Internal Fixation Proximal Humerus;  Surgeon: Hugo Way MD;  [facility];  Service:              PT Ortho       17 1200    Subjective Comments    Subjective Comments \"Able to reach a little better  -SI    Subjective Pain    Able to rate subjective pain? yes  -SI    Pre-Treatment Pain Level 0  -SI    Post-Treatment Pain Level 0  -SI    Subjective Pain Comment 4 with stretching  -SI    Left Shoulder    Flexion ROM Details    -SI    ABduction ROM Details    -SI    Right Shoulder    Flexion ROM Details P-142  -SI    ABduction ROM Details P-120  -SI " "   External Rotation ROM Details P-70  -SI    Internal Rotation ROM Details P-80  -SI      User Key  (r) = Recorded By, (t) = Taken By, (c) = Cosigned By    Initials Name Provider Type    DEVAN Marie PTA Physical Therapy Assistant                            PT Assessment/Plan       08/09/17 1223       PT Assessment    Assessment Comments Pt iin PT with functional each to eye lvel for 1 lb cangood, and able to rech to chest high cabinet for 1/2 gallon bottle liiquid with RUE.  Able to raise 2-3 lbs overhead with two hands....Functional witthout much discomfort.....  -SI       User Key  (r) = Recorded By, (t) = Taken By, (c) = Cosigned By    Initials Name Provider Type    DEVAN aMrie PTA Physical Therapy Assistant                    Exercises       08/09/17 1200          Subjective Comments    Subjective Comments \"Able to reach a little better  -SI      Subjective Pain    Able to rate subjective pain? yes  -SI      Pre-Treatment Pain Level 0  -SI      Post-Treatment Pain Level 0  -SI      Subjective Pain Comment 4 with stretching  -SI      Exercise 1    Exercise Name 1 see exercise flowsheet   constant verbal cues to better posture  -SI        User Key  (r) = Recorded By, (t) = Taken By, (c) = Cosigned By    Initials Name Provider Type    DEVAN Marie PTA Physical Therapy Assistant                        Manual Rx (last 36 hours)      Manual Treatments       08/09/17 1100          Manual Rx 1    Manual Rx 1 Location R shoulder  -SI      Manual Rx 1 Duration 25  -SI        User Key  (r) = Recorded By, (t) = Taken By, (c) = Cosigned By    Initials Name Provider Type    DEVAN Marie PTA Physical Therapy Assistant                    Therapy Education       08/09/17 1223          Therapy Education    Given HEP;Symptoms/condition management;Posture/body mechanics  -SI      Program Progressed  -SI      How Provided Verbal;Demonstration;Written  -SI      Provided to Patient  -SI      Level of " Understanding Teach back education performed  -        User Key  (r) = Recorded By, (t) = Taken By, (c) = Cosigned By    Initials Name Provider Type    DEVAN Marie PTA Physical Therapy Assistant                Time Calculation:   Start Time: 1145  Stop Time: 1230  Time Calculation (min): 45 min    Therapy Charges for Today     Code Description Service Date Service Provider Modifiers Qty    96051709567 HC PT MANUAL THERAPY EA 15 MIN 8/9/2017 Chastity Marie PTA GP 2    34861339813 HC PT THER PROC EA 15 MIN 8/9/2017 Chastity Marie PTA GP 1                    Chastity Marie PTA  8/9/2017

## 2017-08-11 ENCOUNTER — HOSPITAL ENCOUNTER (OUTPATIENT)
Dept: PHYSICAL THERAPY | Facility: HOSPITAL | Age: 82
Setting detail: THERAPIES SERIES
Discharge: HOME | End: 2017-08-11

## 2017-08-11 DIAGNOSIS — Z98.890 S/P ORIF (OPEN REDUCTION INTERNAL FIXATION) FRACTURE: Primary | ICD-10-CM

## 2017-08-11 DIAGNOSIS — Z87.81 S/P ORIF (OPEN REDUCTION INTERNAL FIXATION) FRACTURE: Primary | ICD-10-CM

## 2017-08-11 PROCEDURE — 97110 THERAPEUTIC EXERCISES: CPT

## 2017-08-11 PROCEDURE — 97140 MANUAL THERAPY 1/> REGIONS: CPT

## 2017-08-11 NOTE — THERAPY TREATMENT NOTE
Outpatient Physical Therapy Ortho Treatment Note  [facility]     Patient Name: Amaury Moulton  : 1935  MRN: [id number]  Today's Date: 2017      Visit Date: 2017    Visit Dx:    ICD-10-CM ICD-9-CM   1. S/P ORIF (open reduction internal fixation) fracture Z96.7 V45.89    Z87.81 V15.51       Patient Active Problem List   Diagnosis   • HLD (hyperlipidemia)   • Hernia, inguinal, left   • Edema, peripheral   • Skin growth   • SBO (small bowel obstruction)   • Bradycardia        Past Medical History:   Diagnosis Date   • Arrhythmia    • Atherosclerotic vascular disease    • Benign prostatic hypertrophy    • Bladder calculus     SEES DR. MOYA - UROL   • Bradycardia    • Cardiomegaly    • History of hepatitis     AGE 21   • Hyperlipidemia    • Peripheral edema    • Weakness         Past Surgical History:   Procedure Laterality Date   • CARDIAC PACEMAKER PLACEMENT      Octoberâ€“2014â€“Dr. Allison and associates.   • CATARACT EXTRACTION     • COLONOSCOPY      €“normalâ€“Dr. Ahmet Harvey (surgery)   • HERNIA REPAIR      €“Dr. Ahmet Johnson€“patient had surgery for a left inguinal hernia however at the time of surgery no hernia was found either internally or externally according to the surgical report.   • TOTAL SHOULDER ARTHROPLASTY W/ DISTAL CLAVICLE EXCISION Right 5/15/2017    Procedure: Open Reduction and Internal Fixation Proximal Humerus;  Surgeon: Hugo Way MD;  Location: [facility];  Service:              PT Ortho       17 1300    Subjective Comments    Subjective Comments pt reports some daily pain with ADL's but no sharp pain and no acute pain  -SI    Subjective Pain    Able to rate subjective pain? yes  -SI    Pre-Treatment Pain Level 0  -SI    Post-Treatment Pain Level 0  -SI    Subjective Pain Comment 4 with stretching  -SI    Posture/Observations    Alignment Options Thoracic kyphosis  -SI    Thoracic Kyphosis Severe  -SI    Rounded Shoulders  "Severe  -SI    Right Shoulder    Flexion ROM Details A-110  -SI    Right Shoulder    Flexion Gross Movement (3+/5) fair plus  -SI    ABduction Gross Movement (3/5) fair  -SI    Int Rotation Gross Movement (3+/5) fair plus  -SI    Ext Rotation Gross Movement (3+/5) fair plus  -SI      08/09/17 1200    Subjective Comments    Subjective Comments \"Able to reach a little better  -SI    Subjective Pain    Able to rate subjective pain? yes  -SI    Pre-Treatment Pain Level 0  -SI    Post-Treatment Pain Level 0  -SI    Subjective Pain Comment 4 with stretching  -SI    Left Shoulder    Flexion ROM Details    -SI    ABduction ROM Details    -SI    Right Shoulder    Flexion ROM Details P-142  -SI    ABduction ROM Details P-120  -SI    External Rotation ROM Details P-70  -SI    Internal Rotation ROM Details P-80  -SI      User Key  (r) = Recorded By, (t) = Taken By, (c) = Cosigned By    Initials Name Provider Type    DEVAN Marie PTA Physical Therapy Assistant                            PT Assessment/Plan       08/11/17 1309       PT Assessment    Assessment Comments AROM nearly symetrical to his left shoulder.  Strengthening in progress.  -SI       User Key  (r) = Recorded By, (t) = Taken By, (c) = Cosigned By    Initials Name Provider Type    DEVAN Marie PTA Physical Therapy Assistant                    Exercises       08/11/17 1300          Subjective Comments    Subjective Comments pt reports some daily pain with ADL's but no sharp pain and no acute pain  -SI      Subjective Pain    Able to rate subjective pain? yes  -SI      Pre-Treatment Pain Level 0  -SI      Post-Treatment Pain Level 0  -SI      Subjective Pain Comment 4 with stretching  -SI      Exercise 1    Exercise Name 1 see exercise flowsheet  -SI        User Key  (r) = Recorded By, (t) = Taken By, (c) = Cosigned By    Initials Name Provider Type    DEVAN Marie PTA Physical Therapy Assistant                        Manual Rx (last 36 hours)    "   Manual Treatments       08/11/17 1300          Manual Rx 1    Manual Rx 1 Location R shoulder  -SI      Manual Rx 1 Duration 25  -SI        User Key  (r) = Recorded By, (t) = Taken By, (c) = Cosigned By    Initials Name Provider Type    DEVAN Marie PTA Physical Therapy Assistant                PT OP Goals       08/11/17 1300       Long Term Goals    LTG 1 Pt will demonstrate R shoulder MMT 4/5  -SI     LTG 1 Progress Ongoing  -SI       User Key  (r) = Recorded By, (t) = Taken By, (c) = Cosigned By    Initials Name Provider Type    DEVAN Marie PTA Physical Therapy Assistant                Therapy Education       08/11/17 1308          Therapy Education    Given HEP;Symptoms/condition management  -SI      Program Reinforced  -SI      How Provided Verbal;Demonstration;Written  -SI      Provided to Patient  -SI      Level of Understanding Teach back education performed   demonstrates ex on HEP correctly  -SI        User Key  (r) = Recorded By, (t) = Taken By, (c) = Cosigned By    Initials Name Provider Type    DEVAN Marie PTA Physical Therapy Assistant                Time Calculation:   Start Time: 1145  Stop Time: 1230  Time Calculation (min): 45 min    Therapy Charges for Today     Code Description Service Date Service Provider Modifiers Qty    03951416534 HC PT THER PROC EA 15 MIN 8/11/2017 Chastity Marie PTA GP 1    44226681506 HC PT MANUAL THERAPY EA 15 MIN 8/11/2017 Chastity Marie PTA GP 2                    Chastity Marie PTA  8/11/2017

## 2017-08-16 ENCOUNTER — HOSPITAL ENCOUNTER (OUTPATIENT)
Dept: PHYSICAL THERAPY | Facility: HOSPITAL | Age: 82
Setting detail: THERAPIES SERIES
Discharge: HOME | End: 2017-08-16

## 2017-08-16 DIAGNOSIS — Z87.81 S/P ORIF (OPEN REDUCTION INTERNAL FIXATION) FRACTURE: Primary | ICD-10-CM

## 2017-08-16 DIAGNOSIS — Z98.890 S/P ORIF (OPEN REDUCTION INTERNAL FIXATION) FRACTURE: Primary | ICD-10-CM

## 2017-08-16 PROCEDURE — 97140 MANUAL THERAPY 1/> REGIONS: CPT

## 2017-08-16 PROCEDURE — 97110 THERAPEUTIC EXERCISES: CPT

## 2017-08-16 NOTE — THERAPY TREATMENT NOTE
Outpatient Physical Therapy Ortho Treatment Note  [facility]     Patient Name: Amaury Moulton  : 1935  MRN: [id number]  Today's Date: 2017      Visit Date: 2017    Visit Dx:    ICD-10-CM ICD-9-CM   1. S/P ORIF (open reduction internal fixation) fracture Z96.7 V45.89    Z87.81 V15.51       Patient Active Problem List   Diagnosis   • HLD (hyperlipidemia)   • Hernia, inguinal, left   • Edema, peripheral   • Skin growth   • SBO (small bowel obstruction)   • Bradycardia        Past Medical History:   Diagnosis Date   • Arrhythmia    • Atherosclerotic vascular disease    • Benign prostatic hypertrophy    • Bladder calculus     SEES DR. MOYA - UROL   • Bradycardia    • Cardiomegaly    • History of hepatitis     AGE 21   • Hyperlipidemia    • Peripheral edema    • Weakness         Past Surgical History:   Procedure Laterality Date   • CARDIAC PACEMAKER PLACEMENT      Octoberâ€“2014â€“Dr. Allison and associates.   • CATARACT EXTRACTION     • COLONOSCOPY      €“normalâ€“Dr. Ahmet Harvey (surgery)   • HERNIA REPAIR      €“Dr. Ahmet Johnson€“patient had surgery for a left inguinal hernia however at the time of surgery no hernia was found either internally or externally according to the surgical report.   • TOTAL SHOULDER ARTHROPLASTY W/ DISTAL CLAVICLE EXCISION Right 5/15/2017    Procedure: Open Reduction and Internal Fixation Proximal Humerus;  Surgeon: Hugo Way MD;  Location: [facility];  Service:              PT Ortho       17 1700    Subjective Comments    Subjective Comments pt states he can reach into top shelf of his refridgerator now.  -SI    Subjective Pain    Able to rate subjective pain? yes  -SI    Pre-Treatment Pain Level 0  -SI    Post-Treatment Pain Level 0  -SI    Subjective Pain Comment 3-4 with stretching  -SI    Posture/Observations    Alignment Options Thoracic kyphosis  -SI    Thoracic Kyphosis Severe  -SI    Rounded Shoulders Severe   -SI    Right Shoulder    Flexion ROM Details P-150  -SI    External Rotation ROM Details P-70  -SI    Internal Rotation ROM Details P-80  -SI      User Key  (r) = Recorded By, (t) = Taken By, (c) = Cosigned By    Initials Name Provider Type    DEVAN Marie PTA Physical Therapy Assistant                            PT Assessment/Plan       08/16/17 1712       PT Assessment    Assessment Comments Progressing but slowly.  Pt RTMD later this week  -SI       User Key  (r) = Recorded By, (t) = Taken By, (c) = Cosigned By    Initials Name Provider Type    DEVAN Marie PTA Physical Therapy Assistant                    Exercises       08/16/17 1700          Subjective Comments    Subjective Comments pt states he can reach into top shelf of his refridgerator now.  -SI      Subjective Pain    Able to rate subjective pain? yes  -SI      Pre-Treatment Pain Level 0  -SI      Post-Treatment Pain Level 0  -SI      Subjective Pain Comment 3-4 with stretching  -SI      Exercise 1    Exercise Name 1 see exercise flowsheet  -SI        User Key  (r) = Recorded By, (t) = Taken By, (c) = Cosigned By    Initials Name Provider Type    DEVAN Marie PTA Physical Therapy Assistant                        Manual Rx (last 36 hours)      Manual Treatments       08/16/17 1700          Manual Rx 1    Manual Rx 1 Location R shoulder  -SI      Manual Rx 1 Duration 25  -SI        User Key  (r) = Recorded By, (t) = Taken By, (c) = Cosigned By    Initials Name Provider Type    DEVAN Marie PTA Physical Therapy Assistant                PT OP Goals       08/16/17 1700       PT Short Term Goals    STG 1 Pt will be independent with HEP to promote R shoulder strengthening and ROM to allow full return to functional use of R shoulder  -SI     STG 1 Progress Progressing  -SI       User Key  (r) = Recorded By, (t) = Taken By, (c) = Cosigned By    Initials Name Provider Type    DEVAN Marie PTA Physical Therapy Assistant                 Therapy Education       08/16/17 1711          Therapy Education    Given HEP;Symptoms/condition management  -SI      Program Progressed  -SI      How Provided Verbal;Demonstration;Written  -SI      Provided to Patient  -SI      Level of Understanding Teach back education performed  -SI        User Key  (r) = Recorded By, (t) = Taken By, (c) = Cosigned By    Initials Name Provider Type    DEVAN Marie PTA Physical Therapy Assistant                Time Calculation:   Start Time: 1145  Stop Time: 1230  Time Calculation (min): 45 min    Therapy Charges for Today     Code Description Service Date Service Provider Modifiers Qty    42320513813 HC PT MANUAL THERAPY EA 15 MIN 8/16/2017 Chastity Marie PTA GP 2    45970614532 HC PT THER PROC EA 15 MIN 8/16/2017 Chastity Marie PTA GP 1                    Chastity Marie PTA  8/16/2017

## 2017-08-18 ENCOUNTER — OFFICE VISIT (OUTPATIENT)
Dept: ORTHOPEDIC SURGERY | Facility: CLINIC | Age: 82
End: 2017-08-18

## 2017-08-18 ENCOUNTER — HOSPITAL ENCOUNTER (OUTPATIENT)
Dept: PHYSICAL THERAPY | Facility: HOSPITAL | Age: 82
Setting detail: THERAPIES SERIES
Discharge: HOME | End: 2017-08-18

## 2017-08-18 VITALS — TEMPERATURE: 98 F | WEIGHT: 166 LBS | BODY MASS INDEX: 22.48 KG/M2 | HEIGHT: 72 IN

## 2017-08-18 DIAGNOSIS — Z09 SURGERY FOLLOW-UP: ICD-10-CM

## 2017-08-18 DIAGNOSIS — Z87.81 S/P ORIF (OPEN REDUCTION INTERNAL FIXATION) FRACTURE: Primary | ICD-10-CM

## 2017-08-18 DIAGNOSIS — Z98.890 S/P SHOULDER SURGERY: Primary | ICD-10-CM

## 2017-08-18 DIAGNOSIS — Z98.890 S/P ORIF (OPEN REDUCTION INTERNAL FIXATION) FRACTURE: Primary | ICD-10-CM

## 2017-08-18 PROCEDURE — 73030 X-RAY EXAM OF SHOULDER: CPT | Performed by: ORTHOPAEDIC SURGERY

## 2017-08-18 PROCEDURE — 97140 MANUAL THERAPY 1/> REGIONS: CPT

## 2017-08-18 PROCEDURE — 99024 POSTOP FOLLOW-UP VISIT: CPT | Performed by: ORTHOPAEDIC SURGERY

## 2017-08-18 PROCEDURE — 97110 THERAPEUTIC EXERCISES: CPT

## 2017-08-18 NOTE — THERAPY TREATMENT NOTE
Outpatient Physical Therapy Ortho Treatment Note  [facility]     Patient Name: Amaury Moulton  : 1935  MRN: [id number]  Today's Date: 2017      Visit Date: 2017    Visit Dx:    ICD-10-CM ICD-9-CM   1. S/P ORIF (open reduction internal fixation) fracture Z96.7 V45.89    Z87.81 V15.51       Patient Active Problem List   Diagnosis   • HLD (hyperlipidemia)   • Hernia, inguinal, left   • Edema, peripheral   • Skin growth   • SBO (small bowel obstruction)   • Bradycardia        Past Medical History:   Diagnosis Date   • Arrhythmia    • Atherosclerotic vascular disease    • Benign prostatic hypertrophy    • Bladder calculus     SEES DR. MOYA - UROL   • Bradycardia    • Cardiomegaly    • History of hepatitis     AGE 21   • Hyperlipidemia    • Peripheral edema    • Weakness         Past Surgical History:   Procedure Laterality Date   • CARDIAC PACEMAKER PLACEMENT      Octoberâ€“2014â€“Dr. Allison and associates.   • CATARACT EXTRACTION     • COLONOSCOPY      €“normalâ€“Dr. Ahmet Harvey (surgery)   • HERNIA REPAIR      €“Dr. Ahmet Johnson€“patient had surgery for a left inguinal hernia however at the time of surgery no hernia was found either internally or externally according to the surgical report.   • TOTAL SHOULDER ARTHROPLASTY W/ DISTAL CLAVICLE EXCISION Right 5/15/2017    Procedure: Open Reduction and Internal Fixation Proximal Humerus;  Surgeon: Hugo Way MD;  Location: [facility];  Service:              PT Ortho       17 1500    Subjective Comments    Subjective Comments pt's daughter and wife with him today as going to MD.  Questions answered as able.  Pt with improved ability with ADL's.  -SI    Subjective Pain    Able to rate subjective pain? yes  -SI    Pre-Treatment Pain Level 0  -SI    Post-Treatment Pain Level 0  -SI    Subjective Pain Comment Pain left shoulder 3-4 with stretching at end ranges  -SI    Posture/Observations    Alignment  Options Thoracic kyphosis  -SI    Thoracic Kyphosis Severe  -SI    Rounded Shoulders Severe  -SI    Left Shoulder    Flexion ROM Details A/P 115/150  -SI    ABduction ROM Details 105/142  -SI    External Rotation ROM Details T1/74  -SI    Internal Rotation ROM Details T10/80  -SI    Right Shoulder    Flexion ROM Details A-PROM right shoulder 110/150  -SI    ABduction ROM Details 95/120  -SI    External Rotation ROM Details T1/67  -SI    Internal Rotation ROM Details T10/80  -SI    Right Shoulder    Flexion Gross Movement (4-/5) good minus  -SI    ABduction Gross Movement (4-/5) good minus  -SI    Int Rotation Gross Movement (4-/5) good minus  -SI    Ext Rotation Gross Movement (4-/5) good minus  -SI      08/16/17 1700    Subjective Comments    Subjective Comments pt states he can reach into top shelf of his refridgerator now.  -SI    Subjective Pain    Able to rate subjective pain? yes  -SI    Pre-Treatment Pain Level 0  -SI    Post-Treatment Pain Level 0  -SI    Subjective Pain Comment 3-4 with stretching  -SI    Posture/Observations    Alignment Options Thoracic kyphosis  -SI    Thoracic Kyphosis Severe  -SI    Rounded Shoulders Severe  -SI    Right Shoulder    Flexion ROM Details P-150  -SI    External Rotation ROM Details P-70  -SI    Internal Rotation ROM Details P-80  -SI      User Key  (r) = Recorded By, (t) = Taken By, (c) = Cosigned By    Initials Name Provider Type    DEVAN Marie PTA Physical Therapy Assistant                            PT Assessment/Plan       08/18/17 1601       PT Assessment    Assessment Comments AROM in standing nearly symetrical to his left shoulder.  Strength improving.  Functional reach improveing as reports ablt to reach to top shelf of refridgerator.    -SI       User Key  (r) = Recorded By, (t) = Taken By, (c) = Cosigned By    Initials Name Provider Type    DEVAN Marie PTA Physical Therapy Assistant                    Exercises       08/18/17 1500           Subjective Comments    Subjective Comments pt's daughter and wife with him today as going to MD.  Questions answered as able.  Pt with improved ability with ADL's.  -SI      Subjective Pain    Able to rate subjective pain? yes  -SI      Pre-Treatment Pain Level 0  -SI      Post-Treatment Pain Level 0  -SI      Subjective Pain Comment Pain left shoulder 3-4 with stretching at end ranges  -SI      Exercise 1    Exercise Name 1 see exercise flowsheet  -SI        User Key  (r) = Recorded By, (t) = Taken By, (c) = Cosigned By    Initials Name Provider Type    DEVAN Marie PTA Physical Therapy Assistant                        Manual Rx (last 36 hours)      Manual Treatments       08/18/17 1500          Manual Rx 1    Manual Rx 1 Location R shoulder  -SI      Manual Rx 1 Duration 25  -SI        User Key  (r) = Recorded By, (t) = Taken By, (c) = Cosigned By    Initials Name Provider Type    DEVAN Marie PTA Physical Therapy Assistant                    Therapy Education       08/18/17 1601          Therapy Education    Given HEP;Symptoms/condition management  -SI      Program Reinforced  -SI      How Provided Verbal;Demonstration;Written  -SI      Provided to Patient  -SI      Level of Understanding Teach back education performed  -SI        User Key  (r) = Recorded By, (t) = Taken By, (c) = Cosigned By    Initials Name Provider Type    DEVAN Marie PTA Physical Therapy Assistant                Time Calculation:   Start Time: 1500  Stop Time: 1550  Time Calculation (min): 50 min    Therapy Charges for Today     Code Description Service Date Service Provider Modifiers Qty    14783886483 HC PT THER PROC EA 15 MIN 8/18/2017 Chastity Marie PTA GP 1    85917791260 HC PT MANUAL THERAPY EA 15 MIN 8/18/2017 Chastity Marie PTA GP 2                    Chastity Marie PTA  8/18/2017

## 2017-08-18 NOTE — PROGRESS NOTES
Amaury Moulton : 1935 MRN: [id number] DATE: 2017    CC: 3 months s/p ORIF right proximal humerus fracture    HPI: Pt. returns to clinic today stating pain is minimal.  Motion is steadily improving.  Denies any new concerns or issues.    Vitals:    17 1637   Temp: 98 °F (36.7 °C)       Current Outpatient Prescriptions:   •  atorvastatin (LIPITOR) 40 MG tablet, TAKE ONE-HALF TABLET BY MOUTH DAILY, Disp: 15 tablet, Rfl: 1  •  docusate sodium (COLACE) 100 MG capsule, Take 1 capsule by mouth 2 (Two) Times a Day., Disp: 50 capsule, Rfl: 0  •  finasteride (PROSCAR) 5 MG tablet, Take 5 mg by mouth Daily., Disp: , Rfl:   •  HYDROcodone-acetaminophen (NORCO) 5-325 MG per tablet, Take 1-2 tablets by mouth Every 4 (Four) Hours As Needed (Pain)., Disp: 50 tablet, Rfl: 0  •  ibuprofen (ADVIL,MOTRIN) 200 MG tablet, Take 200 mg by mouth Every 6 (Six) Hours As Needed for Mild Pain (1-3). HOLD PRIOR TO SURG, Disp: , Rfl:   •  Omega-3 Fatty Acids (OMEGA 3 PO), Take 1 capsule by mouth Daily. HOLD PRIOR TO SURG, Disp: , Rfl:   •  ondansetron (ZOFRAN) 4 MG tablet, Take 1 tablet by mouth Every 8 (Eight) Hours As Needed for Nausea or Vomiting., Disp: 20 tablet, Rfl: 0  •  tamsulosin (FLOMAX) 0.4 MG capsule 24 hr capsule, Take 1 capsule by mouth Every Night., Disp: , Rfl:     Past Medical History:   Diagnosis Date   • Arrhythmia    • Atherosclerotic vascular disease    • Benign prostatic hypertrophy    • Bladder calculus     SEES DR. MOYA - UROL   • Bradycardia    • Cardiomegaly    • History of hepatitis     AGE 21   • Hyperlipidemia    • Peripheral edema    • Weakness        Past Surgical History:   Procedure Laterality Date   • CARDIAC PACEMAKER PLACEMENT      Octoberâ€“2014â€“Dr. Allison and associates.   • CATARACT EXTRACTION     • COLONOSCOPY      €“normalâ€“Dr. Ahmet Harvey (surgery)   • HERNIA REPAIR      €“Dr. Ahmet Harveyâ€“patient had surgery for a left inguinal hernia however at the  time of surgery no hernia was found either internally or externally according to the surgical report.   • TOTAL SHOULDER ARTHROPLASTY W/ DISTAL CLAVICLE EXCISION Right 5/15/2017    Procedure: Open Reduction and Internal Fixation Proximal Humerus;  Surgeon: Hugo Way MD;  Location: [facility];  Service:        History reviewed. No pertinent family history.    Social History     Social History   • Marital status:      Spouse name: N/A   • Number of children: N/A   • Years of education: N/A     Occupational History   • Not on file.     Social History Main Topics   • Smoking status: Never Smoker   • Smokeless tobacco: Never Used   • Alcohol use No   • Drug use: No   • Sexual activity: Defer     Other Topics Concern   • Not on file     Social History Narrative     Exam:   Incision is healed.  Arm and forearm soft.  Motion is to 160 forward elevation, 40 external rotation, L2 internal rotation.  Good motor and sensory function distally.  Palpable pulses with good cap refill.      Imaging   2v xrays including AP, scapular Y are ordered and reviewed by me to evaluate alignment and for comparison purposes.  No new or concerning findings noted. Fracture appears well-aligned.  Hardware appears in good position.  There has been significant callous formation.    Impression:   3 months s/p ORIF right proximal humerus fracture    Plan:  1.  Continue working on ROM.  2.  NO restrictions necessary at this point.  3.  Will release to follow up as needed.

## 2017-08-23 ENCOUNTER — HOSPITAL ENCOUNTER (OUTPATIENT)
Dept: PHYSICAL THERAPY | Facility: HOSPITAL | Age: 82
Setting detail: THERAPIES SERIES
Discharge: HOME | End: 2017-08-23

## 2017-08-23 DIAGNOSIS — Z87.81 S/P ORIF (OPEN REDUCTION INTERNAL FIXATION) FRACTURE: Primary | ICD-10-CM

## 2017-08-23 DIAGNOSIS — Z98.890 S/P ORIF (OPEN REDUCTION INTERNAL FIXATION) FRACTURE: Primary | ICD-10-CM

## 2017-08-23 PROCEDURE — 97110 THERAPEUTIC EXERCISES: CPT

## 2017-08-23 PROCEDURE — 97140 MANUAL THERAPY 1/> REGIONS: CPT

## 2017-08-23 NOTE — THERAPY TREATMENT NOTE
Outpatient Physical Therapy Ortho Treatment Note  [facility]     Patient Name: Amaury Moulton  : 1935  MRN: [id number]  Today's Date: 2017      Visit Date: 2017    Visit Dx:    ICD-10-CM ICD-9-CM   1. S/P ORIF (open reduction internal fixation) fracture Z96.7 V45.89    Z87.81 V15.51       Patient Active Problem List   Diagnosis   • HLD (hyperlipidemia)   • Hernia, inguinal, left   • Edema, peripheral   • Skin growth   • SBO (small bowel obstruction)   • Bradycardia        Past Medical History:   Diagnosis Date   • Arrhythmia    • Atherosclerotic vascular disease    • Benign prostatic hypertrophy    • Bladder calculus     SEES DR. MOYA - UROL   • Bradycardia    • Cardiomegaly    • History of hepatitis     AGE 21   • Hyperlipidemia    • Peripheral edema    • Weakness         Past Surgical History:   Procedure Laterality Date   • CARDIAC PACEMAKER PLACEMENT      Octoberâ€“2014â€“Dr. Allison and associates.   • CATARACT EXTRACTION     • COLONOSCOPY      €“normalâ€“Dr. Ahmet Harvey (surgery)   • HERNIA REPAIR      €“Dr. Ahmet Johnson€“patient had surgery for a left inguinal hernia however at the time of surgery no hernia was found either internally or externally according to the surgical report.   • TOTAL SHOULDER ARTHROPLASTY W/ DISTAL CLAVICLE EXCISION Right 5/15/2017    Procedure: Open Reduction and Internal Fixation Proximal Humerus;  Surgeon: Hugo Way MD;  Location: [facility];  Service:              PT Ortho       17 1300    Subjective Comments    Subjective Comments Went to MD, said Xrays good.  -SI    Subjective Pain    Able to rate subjective pain? yes  -SI    Pre-Treatment Pain Level 0  -SI    Post-Treatment Pain Level 0  -SI    Subjective Pain Comment pain right shoulder 3-4 end ranges with stretching  -SI    Posture/Observations    Alignment Options Thoracic kyphosis  -SI      User Key  (r) = Recorded By, (t) = Taken By, (c) = Cosigned By     Initials Name Provider Type    DEVAN Chastity JAVIER Marie PTA Physical Therapy Assistant                            PT Assessment/Plan       08/23/17 1309       PT Assessment    Assessment Comments When asked pt states he would like to be able to brandyn for things better.....Reports compliance with HEP which was upgraded today.  Pt able to lift 1/2 gallon bottle with RUE to chest height shelf and with two hands to above eye level shelf..  -SI       User Key  (r) = Recorded By, (t) = Taken By, (c) = Cosigned By    Initials Name Provider Type    DEVAN Oconnorila JAVIER Marie PTA Physical Therapy Assistant                    Exercises       08/23/17 1300          Subjective Comments    Subjective Comments Went to MD, said Xrays good.  -SI      Subjective Pain    Able to rate subjective pain? yes  -SI      Pre-Treatment Pain Level 0  -SI      Post-Treatment Pain Level 0  -SI      Subjective Pain Comment pain right shoulder 3-4 end ranges with stretching  -SI      Exercise 1    Exercise Name 1 see exercise flowsheet  -SI        User Key  (r) = Recorded By, (t) = Taken By, (c) = Cosigned By    Initials Name Provider Type    DEVAN Marie PTA Physical Therapy Assistant                        Manual Rx (last 36 hours)      Manual Treatments       08/23/17 1300          Manual Rx 1    Manual Rx 1 Location right shoulser  -SI      Manual Rx 1 Duration 20  -SI        User Key  (r) = Recorded By, (t) = Taken By, (c) = Cosigned By    Initials Name Provider Type    DEVAN Marie PTA Physical Therapy Assistant                    Therapy Education       08/23/17 1308          Therapy Education    Given HEP;Symptoms/condition management  -SI      Program Progressed  -SI      How Provided Verbal;Demonstration;Written  -SI      Provided to Patient  -SI      Level of Understanding Teach back education performed  -SI        User Key  (r) = Recorded By, (t) = Taken By, (c) = Cosigned By    Initials Name Provider Type    DEVAN Marie PTA  Physical Therapy Assistant                Time Calculation:   Start Time: 1145  Stop Time: 1230  Time Calculation (min): 45 min    Therapy Charges for Today     Code Description Service Date Service Provider Modifiers Qty    70326557567  PT THER PROC EA 15 MIN 8/23/2017 Chastity Marie PTA GP 2    [id number]  PT MANUAL THERAPY EA 15 MIN 8/23/2017 Chastity Marie PTA GP 1                    Chastity Marie PTA  8/23/2017

## 2017-08-25 ENCOUNTER — HOSPITAL ENCOUNTER (OUTPATIENT)
Dept: PHYSICAL THERAPY | Facility: HOSPITAL | Age: 82
Setting detail: THERAPIES SERIES
Discharge: HOME | End: 2017-08-25

## 2017-08-25 DIAGNOSIS — Z98.890 S/P ORIF (OPEN REDUCTION INTERNAL FIXATION) FRACTURE: Primary | ICD-10-CM

## 2017-08-25 DIAGNOSIS — Z87.81 S/P ORIF (OPEN REDUCTION INTERNAL FIXATION) FRACTURE: Primary | ICD-10-CM

## 2017-08-25 PROCEDURE — 97110 THERAPEUTIC EXERCISES: CPT

## 2017-08-25 PROCEDURE — 97140 MANUAL THERAPY 1/> REGIONS: CPT

## 2017-08-25 NOTE — THERAPY TREATMENT NOTE
Outpatient Physical Therapy Ortho Treatment Note  [facility]     Patient Name: Amaury Moulton  : 1935  MRN: [id number]  Today's Date: 2017      Visit Date: 2017    Visit Dx:    ICD-10-CM ICD-9-CM   1. S/P ORIF (open reduction internal fixation) fracture Z96.7 V45.89    Z87.81 V15.51       Patient Active Problem List   Diagnosis   • HLD (hyperlipidemia)   • Hernia, inguinal, left   • Edema, peripheral   • Skin growth   • SBO (small bowel obstruction)   • Bradycardia        Past Medical History:   Diagnosis Date   • Arrhythmia    • Atherosclerotic vascular disease    • Benign prostatic hypertrophy    • Bladder calculus     SEES DR. MOYA - UROL   • Bradycardia    • Cardiomegaly    • History of hepatitis     AGE 21   • Hyperlipidemia    • Peripheral edema    • Weakness         Past Surgical History:   Procedure Laterality Date   • CARDIAC PACEMAKER PLACEMENT      Octoberâ€“2014â€“Dr. Allison and associates.   • CATARACT EXTRACTION     • COLONOSCOPY      €“normalâ€“Dr. Ahmet Harvey (surgery)   • HERNIA REPAIR      €“Dr. Ahmet Johnson€“patient had surgery for a left inguinal hernia however at the time of surgery no hernia was found either internally or externally according to the surgical report.   • TOTAL SHOULDER ARTHROPLASTY W/ DISTAL CLAVICLE EXCISION Right 5/15/2017    Procedure: Open Reduction and Internal Fixation Proximal Humerus;  Surgeon: Hugo Way MD;  Location: [facility];  Service:              PT Ortho       17 1700    Subjective Comments    Subjective Comments pt reports improved reach and minimal pain right shoulder with ADL's  -SI    Subjective Pain    Able to rate subjective pain? yes  -SI    Pre-Treatment Pain Level 0  -SI    Post-Treatment Pain Level 0  -SI    Subjective Pain Comment sub-acute pain 3-4 at end ranges with stretching  -SI    Posture/Observations    Alignment Options Thoracic kyphosis  -SI    Thoracic Kyphosis Severe   -SI    Rounded Shoulders Severe  -SI    Left Shoulder    ABduction ROM Details A-110  -SI    Right Shoulder    Flexion ROM Details 115/145  -SI    ABduction ROM Details 110/135  -SI    External Rotation ROM Details T1/65  -SI    Internal Rotation ROM Details T10/80  -SI    Right Shoulder    Flexion Gross Movement (4-/5) good minus  -SI    ABduction Gross Movement (4-/5) good minus  -SI    Int Rotation Gross Movement (4-/5) good minus  -SI    Ext Rotation Gross Movement (4-/5) good minus  -SI      08/23/17 1300    Subjective Comments    Subjective Comments Went to MD, said Xrays good.  -SI    Subjective Pain    Able to rate subjective pain? yes  -SI    Pre-Treatment Pain Level 0  -SI    Post-Treatment Pain Level 0  -SI    Subjective Pain Comment pain right shoulder 3-4 end ranges with stretching  -SI    Posture/Observations    Alignment Options Thoracic kyphosis  -SI      User Key  (r) = Recorded By, (t) = Taken By, (c) = Cosigned By    Initials Name Provider Type    DEVAN Marie PTA Physical Therapy Assistant                            PT Assessment/Plan       08/25/17 1709       PT Assessment    Assessment Comments Strength improving.  His AROM is nearly symetrical to his left with limitations due to postural changes.  Good effort in PT and reports compliance with HEP.  Plan 2 more sessions and DC on HEP.  -SI       User Key  (r) = Recorded By, (t) = Taken By, (c) = Cosigned By    Initials Name Provider Type    DEVAN Marie PTA Physical Therapy Assistant                Modalities       08/25/17 1700          Ice    Patient reports will apply ice at home to involved area Yes  -SI        User Key  (r) = Recorded By, (t) = Taken By, (c) = Cosigned By    Initials Name Provider Type    DEVAN Marie PTA Physical Therapy Assistant                Exercises       08/25/17 1700          Subjective Comments    Subjective Comments pt reports improved reach and minimal pain right shoulder with ADL's  -SI       Subjective Pain    Able to rate subjective pain? yes  -SI      Pre-Treatment Pain Level 0  -SI      Post-Treatment Pain Level 0  -SI      Subjective Pain Comment sub-acute pain 3-4 at end ranges with stretching  -SI      Exercise 1    Exercise Name 1 see exercise flowsheet  -SI        User Key  (r) = Recorded By, (t) = Taken By, (c) = Cosigned By    Initials Name Provider Type    DEVAN Marie PTA Physical Therapy Assistant                        Manual Rx (last 36 hours)      Manual Treatments       08/25/17 1700          Manual Rx 1    Manual Rx 1 Location right shoulser  -SI      Manual Rx 1 Duration 20  -SI        User Key  (r) = Recorded By, (t) = Taken By, (c) = Cosigned By    Initials Name Provider Type    DEVAN Marie PTA Physical Therapy Assistant                PT OP Goals       08/25/17 1700       PT Short Term Goals    STG 2 Pt will demonstrate R shoulder AROM flex 125, , ER C7, IR T10  -SI     STG 2 Progress Progressing  -SI     Long Term Goals    LTG 1 Pt will demonstrate R shoulder MMT 4/5  -SI     LTG 1 Progress Progressing  -SI     LTG 2 Pt will be able to lift 5 pounds overhead to place item on a shelf.  -SI     LTG 2 Progress Met  -SI       User Key  (r) = Recorded By, (t) = Taken By, (c) = Cosigned By    Initials Name Provider Type    DEVAN Marie PTA Physical Therapy Assistant                Therapy Education       08/25/17 1707          Therapy Education    Given HEP;Symptoms/condition management  -SI      Program Reinforced  -SI      How Provided Verbal;Demonstration;Written  -SI      Provided to Patient  -SI      Level of Understanding Teach back education performed  -SI        User Key  (r) = Recorded By, (t) = Taken By, (c) = Cosigned By    Initials Name Provider Type    DEVAN Marie PTA Physical Therapy Assistant                Time Calculation:   Start Time: 1145  Stop Time: 1230  Time Calculation (min): 45 min    Therapy Charges for Today     Code  Description Service Date Service Provider Modifiers Qty    80071788817 HC PT THER PROC EA 15 MIN 8/25/2017 Chastity Marie PTA GP 2    08328774898 HC PT MANUAL THERAPY EA 15 MIN 8/25/2017 Chastity Marie PTA GP 1                    Chastity Marie PTA  8/25/2017

## 2017-08-29 ENCOUNTER — HOSPITAL ENCOUNTER (OUTPATIENT)
Dept: PHYSICAL THERAPY | Facility: HOSPITAL | Age: 82
Setting detail: THERAPIES SERIES
Discharge: HOME | End: 2017-08-29

## 2017-08-29 PROCEDURE — 97110 THERAPEUTIC EXERCISES: CPT | Performed by: PHYSICAL THERAPIST

## 2017-08-29 PROCEDURE — 97140 MANUAL THERAPY 1/> REGIONS: CPT | Performed by: PHYSICAL THERAPIST

## 2017-08-29 NOTE — THERAPY TREATMENT NOTE
Outpatient Physical Therapy Ortho Treatment Note  [facility]     Patient Name: Amaury Moulton  : 1935  MRN: [id number]  Today's Date: 2017      Visit Date: 2017    Visit Dx:  No diagnosis found.    Patient Active Problem List   Diagnosis   • HLD (hyperlipidemia)   • Hernia, inguinal, left   • Edema, peripheral   • Skin growth   • SBO (small bowel obstruction)   • Bradycardia        Past Medical History:   Diagnosis Date   • Arrhythmia    • Atherosclerotic vascular disease    • Benign prostatic hypertrophy    • Bladder calculus     SEES DR. MOYA - UROL   • Bradycardia    • Cardiomegaly    • History of hepatitis     AGE 21   • Hyperlipidemia    • Peripheral edema    • Weakness         Past Surgical History:   Procedure Laterality Date   • CARDIAC PACEMAKER PLACEMENT      Octoberâ€“2014â€“Dr. Allison and associates.   • CATARACT EXTRACTION     • COLONOSCOPY      €“normalâ€“Dr. Ahmet Harvey (surgery)   • HERNIA REPAIR      €“Dr. Ahmet Johnson€“patient had surgery for a left inguinal hernia however at the time of surgery no hernia was found either internally or externally according to the surgical report.   • TOTAL SHOULDER ARTHROPLASTY W/ DISTAL CLAVICLE EXCISION Right 5/15/2017    Procedure: Open Reduction and Internal Fixation Proximal Humerus;  Surgeon: Hugo Way MD;  Location: [facility];  Service:                              PT Assessment/Plan       17 1225       PT Assessment    Assessment Comments Minimal to no pain at rest, pain 3-4/10 with ROM/stretching and OH activity.  He does require intermittent cuing for posture and correct form/technique with ther ex.  Overall seems to be doing fairly well with everyday function and will likely be ready for D/C to independent [facility] next session.  -RA     PT Plan    PT Plan Comments Continue skilled therapy working towards transition to independent home program for continued progression of ROM/strength  for increased ease/tolerance with daily functional use of R UE.  -RA       User Key  (r) = Recorded By, (t) = Taken By, (c) = Cosigned By    Initials Name Provider Type    TYLER Rapp PT Physical Therapist                    Exercises       08/29/17 1100          Subjective Comments    Subjective Comments No pain at rest, pain with reaching activities 3-4/10   -RA      Subjective Pain    Able to rate subjective pain? yes  -RA      Pre-Treatment Pain Level 0  -RA      Subjective Pain Comment reports pain with stretching shoulder 3-4/10  -RA      Exercise 1    Exercise Name 1 see exercise flowsheet  -RA        User Key  (r) = Recorded By, (t) = Taken By, (c) = Cosigned By    Initials Name Provider Type    TYLER Rapp PT Physical Therapist                        Manual Rx (last 36 hours)      Manual Treatments       08/29/17 1100          Manual Rx 1    Manual Rx 1 Location right shoulder  -RA      Manual Rx 1 Type PROM as tolerated, gentle end range stretching  -RA      Manual Rx 1 Duration 20  -RA        User Key  (r) = Recorded By, (t) = Taken By, (c) = Cosigned By    Initials Name Provider Type    TYLER Rapp PT Physical Therapist                    Therapy Education       08/29/17 1154          Therapy Education    Given HEP;Symptoms/condition management  -RA      Program Reinforced  -RA      How Provided Verbal;Demonstration  -RA      Provided to Patient  -RA      Level of Understanding Teach back education performed  -RA        User Key  (r) = Recorded By, (t) = Taken By, (c) = Cosigned By    Initials Name Provider Type    TYLER Rapp PT Physical Therapist                Time Calculation:   Start Time: 1145  Stop Time: 1232  Time Calculation (min): 47 min    Therapy Charges for Today     Code Description Service Date Service Provider Modifiers Qty    39838162196  PT THER PROC EA 15 MIN 8/29/2017 Roxana Rapp PT GP 2    [id number]  PT MANUAL THERAPY EA 15 MIN 8/29/2017  Roxana Rapp, PT GP 1                    Roxana Rapp, PT  8/29/2017

## 2017-08-31 ENCOUNTER — HOSPITAL ENCOUNTER (OUTPATIENT)
Dept: PHYSICAL THERAPY | Facility: HOSPITAL | Age: 82
Setting detail: THERAPIES SERIES
Discharge: HOME | End: 2017-08-31

## 2017-08-31 PROCEDURE — 97110 THERAPEUTIC EXERCISES: CPT | Performed by: PHYSICAL THERAPIST

## 2017-09-14 RX ORDER — ATORVASTATIN CALCIUM 40 MG/1
TABLET, FILM COATED ORAL
Qty: 15 TABLET | Refills: 0 | Status: SHIPPED | OUTPATIENT
Start: 2017-09-14 | End: 2017-10-23 | Stop reason: SDUPTHER

## 2017-09-20 ENCOUNTER — TELEPHONE (OUTPATIENT)
Dept: INTERNAL MEDICINE | Age: 82
End: 2017-09-20

## 2017-09-20 NOTE — TELEPHONE ENCOUNTER
Lizabeth Tirado called wanting to speak to the medical assistant regarding pt coming in for an appt on Friday. She wants to discuss a few things. Said it would only take a few minutes. She also stated she would be in and out of meeting all day.  Lizabeth's # [phone]  Thanks. sp

## 2017-09-20 NOTE — TELEPHONE ENCOUNTER
CALLED ANITA 920/17 @ 10:19 TO LET HER KNOW THAT WE CAN'T DISCUSS HIS MEDICAL RECORDS WITH HER DUE TO HIM NOT GIVING US CONSENT.

## 2017-09-22 ENCOUNTER — OFFICE VISIT (OUTPATIENT)
Dept: INTERNAL MEDICINE | Age: 82
End: 2017-09-22

## 2017-09-22 VITALS
HEART RATE: 72 BPM | WEIGHT: 169 LBS | SYSTOLIC BLOOD PRESSURE: 100 MMHG | BODY MASS INDEX: 22.89 KG/M2 | HEIGHT: 72 IN | RESPIRATION RATE: 12 BRPM | DIASTOLIC BLOOD PRESSURE: 60 MMHG

## 2017-09-22 DIAGNOSIS — E78.2 MIXED HYPERLIPIDEMIA: Primary | ICD-10-CM

## 2017-09-22 DIAGNOSIS — H61.23 CERUMINOSIS, BILATERAL: ICD-10-CM

## 2017-09-22 DIAGNOSIS — D64.9 ANEMIA, UNSPECIFIED TYPE: ICD-10-CM

## 2017-09-22 DIAGNOSIS — R53.83 TIRED: ICD-10-CM

## 2017-09-22 LAB
ALBUMIN SERPL-MCNC: 4 G/DL (ref 3.5–5.2)
ALBUMIN/GLOB SERPL: 1.1 G/DL
ALP SERPL-CCNC: 105 U/L (ref 39–117)
ALT SERPL-CCNC: 9 U/L (ref 1–41)
AST SERPL-CCNC: 18 U/L (ref 1–40)
BASOPHILS # BLD AUTO: 0.05 10*3/MM3 (ref 0–0.2)
BASOPHILS NFR BLD AUTO: 0.6 % (ref 0–1.5)
BILIRUB SERPL-MCNC: 0.5 MG/DL (ref 0.1–1.2)
BUN SERPL-MCNC: 26 MG/DL (ref 8–23)
BUN/CREAT SERPL: 21 (ref 7–25)
CALCIUM SERPL-MCNC: 9.7 MG/DL (ref 8.6–10.5)
CHLORIDE SERPL-SCNC: 103 MMOL/L (ref 98–107)
CHOLEST SERPL-MCNC: 137 MG/DL (ref 0–200)
CO2 SERPL-SCNC: 29.1 MMOL/L (ref 22–29)
CREAT SERPL-MCNC: 1.24 MG/DL (ref 0.76–1.27)
EOSINOPHIL # BLD AUTO: 0.16 10*3/MM3 (ref 0–0.7)
EOSINOPHIL NFR BLD AUTO: 2 % (ref 0.3–6.2)
ERYTHROCYTE [DISTWIDTH] IN BLOOD BY AUTOMATED COUNT: 15.1 % (ref 11.5–14.5)
GLOBULIN SER CALC-MCNC: 3.7 GM/DL
GLUCOSE SERPL-MCNC: 94 MG/DL (ref 65–99)
HCT VFR BLD AUTO: 39.9 % (ref 40.4–52.2)
HDLC SERPL-MCNC: 48 MG/DL (ref 40–60)
HGB BLD-MCNC: 12.6 G/DL (ref 13.7–17.6)
IMM GRANULOCYTES # BLD: 0 10*3/MM3 (ref 0–0.03)
IMM GRANULOCYTES NFR BLD: 0 % (ref 0–0.5)
LDLC SERPL CALC-MCNC: 77 MG/DL (ref 0–100)
LYMPHOCYTES # BLD AUTO: 1.79 10*3/MM3 (ref 0.9–4.8)
LYMPHOCYTES NFR BLD AUTO: 22.2 % (ref 19.6–45.3)
MCH RBC QN AUTO: 30.6 PG (ref 27–32.7)
MCHC RBC AUTO-ENTMCNC: 31.6 G/DL (ref 32.6–36.4)
MCV RBC AUTO: 96.8 FL (ref 79.8–96.2)
MONOCYTES # BLD AUTO: 0.3 10*3/MM3 (ref 0.2–1.2)
MONOCYTES NFR BLD AUTO: 3.7 % (ref 5–12)
NEUTROPHILS # BLD AUTO: 5.78 10*3/MM3 (ref 1.9–8.1)
NEUTROPHILS NFR BLD AUTO: 71.5 % (ref 42.7–76)
PLATELET # BLD AUTO: 252 10*3/MM3 (ref 140–500)
POTASSIUM SERPL-SCNC: 5.2 MMOL/L (ref 3.5–5.2)
PROT SERPL-MCNC: 7.7 G/DL (ref 6–8.5)
RBC # BLD AUTO: 4.12 10*6/MM3 (ref 4.6–6)
SODIUM SERPL-SCNC: 141 MMOL/L (ref 136–145)
TRIGL SERPL-MCNC: 58 MG/DL (ref 0–150)
VIT B12 SERPL-MCNC: 773 PG/ML (ref 211–946)
VLDLC SERPL CALC-MCNC: 11.6 MG/DL (ref 5–40)
WBC # BLD AUTO: 8.08 10*3/MM3 (ref 4.5–10.7)

## 2017-09-22 PROCEDURE — 99214 OFFICE O/P EST MOD 30 MIN: CPT | Performed by: INTERNAL MEDICINE

## 2017-09-22 NOTE — PROGRESS NOTES
"  Amaury Moulton is a 82 y.o. male who presents with   Chief Complaint   Patient presents with   • Hyperlipidemia     Checkup; lab updates   • Ceruminosis     Unrelated reason for today's visit: Patient accompanied by his daughter who would like his ears looked at for wax   • Anemia     Unrelated reason for today's visit: Patient is accompanied by his daughter.  Hemoglobin has dropped from 13.82 years ago to the current level of 11.3.   • Fatigue     Unrelated reason for today's visit: History as above.  Tired, no energy.   • Feels woozy      Unrelated reason for today's visit: history as above.  Patient feels \"woozy headed and unsteady\".     • Headache     Unrelated reason for today's visit: History as above.  Patient complains of headaches in the morning primarily which get better as the day wears on.   .    Hyperlipidemia   This is a chronic problem. The current episode started more than 1 year ago. The problem is controlled. Current antihyperlipidemic treatment includes statins. The current treatment provides moderate improvement of lipids. There are no compliance problems.    Anemia   Presents for follow-up visit. Symptoms include light-headedness and malaise/fatigue. (None)     Fatigue   This is a chronic problem. The current episode started more than 1 month ago. The problem has been unchanged. Associated symptoms include fatigue and headaches. He has tried nothing for the symptoms.   Headache    This is a chronic problem. The current episode started more than 1 month ago. The problem has been waxing and waning. The pain is located in the occipital region. He has tried nothing for the symptoms.        The following portions of the patient's history were reviewed and updated as appropriate: allergies, current medications, past medical history and problem list.    Review of Systems   Constitutional: Positive for fatigue and malaise/fatigue.   Eyes: Negative.    Respiratory: Negative.    Cardiovascular: " Negative.    Genitourinary: Negative.    Musculoskeletal: Negative.    Skin: Negative.    Neurological: Positive for light-headedness and headaches.   Psychiatric/Behavioral: Negative.        Objective   Physical Exam   Constitutional: He is oriented to person, place, and time. He appears well-developed and well-nourished. No distress.   HENT:   Head: Normocephalic and atraumatic.   Eyes: Conjunctivae and EOM are normal. Pupils are equal, round, and reactive to light.   Neck: Normal range of motion. Neck supple. No thyromegaly present.   Neck exam negative.  Carotid auscultation normal-no bruits heard.   Cardiovascular: Normal rate, regular rhythm, normal heart sounds and intact distal pulses.  Exam reveals no gallop and no friction rub.    No murmur heard.  Pulmonary/Chest: Effort normal and breath sounds normal. No respiratory distress. He has no wheezes. He has no rales. He exhibits no tenderness.   Neurological: He is alert and oriented to person, place, and time.   Psychiatric: His behavior is normal. Judgment and thought content normal.   Patient with a blunted affect but this is not anything new for him as he has had it in the past.   Nursing note and vitals reviewed.      Assessment/Plan   Amaury was seen today for hyperlipidemia, ceruminosis, anemia, fatigue, feels woozy and headache.    Diagnoses and all orders for this visit:    Mixed hyperlipidemia  -     Comprehensive Metabolic Panel  -     Lipid Panel    Ceruminosis, bilateral  -     Ambulatory Referral to ENT (Otolaryngology)    Tired  -     CBC & Differential  -     Vitamin B12    Anemia, unspecified type  -     Comprehensive Metabolic Panel  -     Vitamin B12      Plan: Labs as above for the issues as outlined.  The patient was encouraged to drink more fluids because of his blood pressure being on the lower end of acceptability at 100/60.  The daughter states that he drinks minimal fluids during the day.  Also protein supplementation was advised in  "the form of \"BOOST-PLUS\" OTC-3 times a day or [employer] protein supplementation as directed on the container.  Also he was encouraged usage of Gatorade for the mineral supplementation and liquid supplementation as well.    Tentative plans for a follow-up checkup/lab update visit in 6 months.  May also need iron supplementation or B12 supplementation if lab tests dictate a need for either.         "

## 2017-10-09 ENCOUNTER — TELEPHONE (OUTPATIENT)
Dept: CARDIOLOGY | Facility: CLINIC | Age: 82
End: 2017-10-09

## 2017-10-09 NOTE — TELEPHONE ENCOUNTER
10/09/17   Amaury Moulton  1935    Home Phone [phone]   Mobile [phone]     Lizabeth calls about her dad, Mr. Moulton, who has had worsening fatigue and lightheadedness since May. He also had a fall in May.    For the past two weeks, his BP has been /60s. His BP at his office visit with Dr. Allison in April was 96/62 with HR 65.     He has seen an ENT on 10/6 for the lightheadedness which occurs after he has been standing for a while and then resolves with rest. There were no issues with his ears found by the ENT.     I scheduled him to see Brie Vessels on 10/13 at 0800.    He takes no cardiac medication other than atorvastatin.    Luisa HERRERA RN

## 2017-10-13 ENCOUNTER — OFFICE VISIT (OUTPATIENT)
Dept: CARDIOLOGY | Facility: CLINIC | Age: 82
End: 2017-10-13

## 2017-10-13 VITALS
BODY MASS INDEX: 23.57 KG/M2 | DIASTOLIC BLOOD PRESSURE: 40 MMHG | HEART RATE: 61 BPM | HEIGHT: 72 IN | SYSTOLIC BLOOD PRESSURE: 62 MMHG | WEIGHT: 174 LBS

## 2017-10-13 DIAGNOSIS — I95.1 ORTHOSTATIC HYPOTENSION: Primary | ICD-10-CM

## 2017-10-13 PROCEDURE — 93000 ELECTROCARDIOGRAM COMPLETE: CPT | Performed by: NURSE PRACTITIONER

## 2017-10-13 PROCEDURE — 99214 OFFICE O/P EST MOD 30 MIN: CPT | Performed by: NURSE PRACTITIONER

## 2017-10-13 RX ORDER — MIDODRINE HYDROCHLORIDE 2.5 MG/1
2.5 TABLET ORAL 2 TIMES DAILY
Qty: 60 TABLET | Refills: 3 | Status: SHIPPED | OUTPATIENT
Start: 2017-10-13 | End: 2017-11-15

## 2017-10-13 NOTE — PROGRESS NOTES
Date of Office Visit: 10/13/2017  Encounter Provider: ARCHANA Valadez  Place of Service: [facility] CARDIOLOGY  Patient Name: Amaury Moulton  :1935    Chief Complaint   Patient presents with   • Fatigue   • Dizziness   :     HPI: Amaury Moulton is a 82 y.o. male comes in today for fatigue and dizziness.  He is a patient of Dr. Allison.  I'm seeing him for the first time today and have reviewed his records.  He has a history of bradycardia, sick sinus syndrome status post permanent pacemaker placement.  This was placed in 2014.    He's had an echocardiogram in the past in  showing normal heart function, and normal valve function.    Patient comes in today with his daughter.  The patient is primary caregiver for his wife.  Since March or April, the patient has had extreme dizziness.  This occurs a few minutes after standing.  He denies any shortness of breath, edema, chest pain orthopnea or PND.  He did fall in May and break his arm.  He is unable to use a walker because there is not enough room in the house due to his wife hoarding.  He has had recent lab work drawn showing slight anemia but not significant to causes hypotension.  He has good kidney function.  He does have urinary retention and has been on Proscar and Flomax for several years.  He did recently see his urologist, Dr. Moya.    Past Medical History:   Diagnosis Date   • Arrhythmia    • Atherosclerotic vascular disease    • Benign prostatic hypertrophy    • Bladder calculus     SEES DR. MOYA - UROL   • Bradycardia    • Cardiomegaly    • History of hepatitis     AGE 21   • Hyperlipidemia    • Left inguinal hernia    • Limb swelling    • Peripheral edema    • Skin lesion    • Small bowel obstruction    • Weakness        Past Surgical History:   Procedure Laterality Date   • CARDIAC PACEMAKER PLACEMENT      Octoberâ€“2014â€“Dr. Allison and associates.   • CATARACT EXTRACTION     • COLONOSCOPY       "January 2015â€“normalâ€“Dr. Ahmet Harvey (surgery)   • HERNIA REPAIR      October 2014â€“Dr. Ahmet Harveyâ€“patient had surgery for a left inguinal hernia however at the time of surgery no hernia was found either internally or externally according to the surgical report.   • TOTAL SHOULDER ARTHROPLASTY W/ DISTAL CLAVICLE EXCISION Right 5/15/2017    Procedure: Open Reduction and Internal Fixation Proximal Humerus;  Surgeon: Hugo Way MD;  Location: [facility];  Service:            Review of Systems   Constitution: Positive for malaise/fatigue. Negative for fever.   HENT: Negative for ear pain, hearing loss, nosebleeds and sore throat.    Eyes: Negative for double vision, pain, vision loss in left eye, vision loss in right eye and visual disturbance.   Cardiovascular: Negative for claudication and leg swelling.   Respiratory: Negative for cough, snoring and wheezing.    Endocrine: Negative for cold intolerance, heat intolerance and polyuria.   Skin: Negative for color change, itching and rash.   Musculoskeletal: Negative for joint pain, joint swelling and muscle cramps.   Gastrointestinal: Negative for abdominal pain, diarrhea, melena, nausea and vomiting.   Genitourinary: Positive for frequency. Negative for bladder incontinence and hematuria.   Neurological: Positive for excessive daytime sleepiness and headaches. Negative for dizziness, light-headedness, paresthesias and seizures.   Psychiatric/Behavioral: Negative for depression. The patient is not nervous/anxious.    All other systems reviewed and are negative.    All other systems reviewed and are negative    Allergies   Allergen Reactions   • No Known Drug Allergy        All aspects of family and social history reviewed.          Objective:     Vitals:    10/13/17 0813 10/13/17 0814   BP: (!) 70/50 (!) 62/40   BP Location: Left arm Right arm   Pulse:  61   Weight: 174 lb (78.9 kg)    Height: 72\" (182.9 cm)      Body mass index is 23.6 " kg/(m^2).    PHYSICAL EXAM:  Physical Exam   Constitutional: He is oriented to person, place, and time. He appears well-developed and well-nourished.   HENT:   Head: Normocephalic and atraumatic.   Neck: Neck supple. No JVD present.   Cardiovascular: Normal rate, regular rhythm, normal heart sounds and intact distal pulses.    Pulses:       Carotid pulses are 2+ on the right side, and 2+ on the left side.       Radial pulses are 2+ on the right side, and 2+ on the left side.        Dorsalis pedis pulses are 2+ on the right side, and 2+ on the left side.   Pulmonary/Chest: Effort normal and breath sounds normal. No accessory muscle usage. No respiratory distress. He has no rales.   Abdominal: Soft. Normal appearance and bowel sounds are normal. There is no tenderness.   Musculoskeletal: Normal range of motion. He exhibits no edema.   Neurological: He is alert and oriented to person, place, and time.   Skin: Skin is warm, dry and intact. He is not diaphoretic.   Psychiatric: He has a normal mood and affect. His speech is normal and behavior is normal. Judgment and thought content normal. Cognition and memory are normal.         ECG 12 Lead  Date/Time: 10/13/2017 8:27 AM  Performed by: HIEU BASS  Authorized by: HIEU BASS   Comparison: compared with previous ECG from 5/12/2017  Similar to previous ECG  Rhythm: paced  Rate: normal  BPM: 61  Conduction: conduction normal  ST Segments: ST segments normal  T Waves: T waves normal  QRS axis: normal  Clinical impression: normal ECG  Comments: Indication: hypotension                Assessment:       Diagnosis Plan   1. Orthostatic hypotension          Orders Placed This Encounter   Procedures   • ECG 12 Lead     This order was created via procedure documentation       Current Outpatient Prescriptions   Medication Sig Dispense Refill   • finasteride (PROSCAR) 5 MG tablet Take 5 mg by mouth Daily.     • ibuprofen (ADVIL,MOTRIN) 200 MG tablet Take 200 mg by mouth  Every 6 (Six) Hours As Needed for Mild Pain (1-3). HOLD PRIOR TO SURG     • Multiple Vitamins-Minerals (MULTI COMPLETE PO) Take 1 tablet by mouth Daily.     • Omega-3 Fatty Acids (OMEGA 3 PO) Take 1 capsule by mouth Daily. HOLD PRIOR TO SURG     • saw palmetto 160 MG capsule Take 160 mg by mouth Daily.     • tamsulosin (FLOMAX) 0.4 MG capsule 24 hr capsule Take 1 capsule by mouth Every Night.     • atorvastatin (LIPITOR) 40 MG tablet TAKE 1/2 TABLET BY MOUTH EVERY DAY (Patient taking differently: TAKE 1 TABLET BY MOUTH EVERY OTHER DAY) 15 tablet 0     No current facility-administered medications for this visit.             Plan:       1.  Orthostatic hypotension-patient has severe orthostatic hypotension.  Lying his blood pressure is 110/60, standing 60/40.  Very dizzy few minutes after standing.  He is on Proscar and Flomax and has been on for several years.  I'm going to discuss with the patient's urologist, if he could stop one of these.  We'll consider starting midodrine.  Will also discuss with patient's urologist if they are okay with this.  I will call the patient's daughter with the conversation I have with urology.  I have suggested increase of salt intake, fluid intake and compression stockings.  I've advised the patient to walk with a walker to assist in preventing falls.      Follow up in office in one month    25 minutes face to face time with greater than 50% used for education regarding orthostatic hypotension      As always, it has been a pleasure to participate in this patient's care.      Sincerely,      ARCHANA Valadez    Addendum: Placed call to Dr. Garcia on Friday. No return call back. Discussed midodrine with Dr. Allison. Okay to start. 2.5 mg midodrine bid sent to pharmacy

## 2017-10-16 NOTE — PROGRESS NOTES
I think that is when it is being used for vertigo and not hypotension. It should be okay. Please let them know that urology did call and okay to stop flomax to see if this helps.

## 2017-10-21 RX ORDER — ATORVASTATIN CALCIUM 40 MG/1
TABLET, FILM COATED ORAL
Qty: 15 TABLET | Refills: 0 | Status: CANCELLED | OUTPATIENT
Start: 2017-10-21

## 2017-10-23 DIAGNOSIS — E78.2 MIXED HYPERLIPIDEMIA: Primary | ICD-10-CM

## 2017-10-23 RX ORDER — ATORVASTATIN CALCIUM 40 MG/1
20 TABLET, FILM COATED ORAL DAILY
Qty: 15 TABLET | Refills: 1 | Status: SHIPPED | OUTPATIENT
Start: 2017-10-23 | End: 2018-01-04 | Stop reason: SDUPTHER

## 2017-10-26 ENCOUNTER — LAB REQUISITION (OUTPATIENT)
Dept: LAB | Facility: HOSPITAL | Age: 82
End: 2017-10-26

## 2017-10-26 DIAGNOSIS — N39.0 URINARY TRACT INFECTION: ICD-10-CM

## 2017-10-26 PROCEDURE — 87086 URINE CULTURE/COLONY COUNT: CPT | Performed by: UROLOGY

## 2017-10-26 PROCEDURE — 87186 SC STD MICRODIL/AGAR DIL: CPT | Performed by: UROLOGY

## 2017-11-01 LAB
BACTERIA SPEC AEROBE CULT: NORMAL
BACTERIA SPEC AEROBE CULT: NORMAL

## 2017-11-15 ENCOUNTER — OFFICE VISIT (OUTPATIENT)
Dept: CARDIOLOGY | Facility: CLINIC | Age: 82
End: 2017-11-15

## 2017-11-15 ENCOUNTER — CLINICAL SUPPORT NO REQUIREMENTS (OUTPATIENT)
Dept: CARDIOLOGY | Facility: CLINIC | Age: 82
End: 2017-11-15

## 2017-11-15 VITALS
HEART RATE: 67 BPM | HEIGHT: 73 IN | BODY MASS INDEX: 22.8 KG/M2 | SYSTOLIC BLOOD PRESSURE: 102 MMHG | DIASTOLIC BLOOD PRESSURE: 70 MMHG | WEIGHT: 172 LBS

## 2017-11-15 DIAGNOSIS — I49.5 SICK SINUS SYNDROME (HCC): Primary | ICD-10-CM

## 2017-11-15 DIAGNOSIS — R00.1 BRADYCARDIA: ICD-10-CM

## 2017-11-15 DIAGNOSIS — I95.1 ORTHOSTATIC HYPOTENSION: Primary | ICD-10-CM

## 2017-11-15 PROCEDURE — 93280 PM DEVICE PROGR EVAL DUAL: CPT | Performed by: INTERNAL MEDICINE

## 2017-11-15 PROCEDURE — 99213 OFFICE O/P EST LOW 20 MIN: CPT | Performed by: INTERNAL MEDICINE

## 2017-11-15 RX ORDER — MIDODRINE HYDROCHLORIDE 2.5 MG/1
2.5 TABLET ORAL 3 TIMES DAILY
Qty: 180 TABLET | Refills: 3 | Status: SHIPPED | OUTPATIENT
Start: 2017-11-15 | End: 2018-01-01 | Stop reason: HOSPADM

## 2017-11-15 RX ORDER — ALFUZOSIN HYDROCHLORIDE 10 MG/1
1 TABLET, EXTENDED RELEASE ORAL DAILY
Refills: 3 | Status: ON HOLD | COMMUNITY
Start: 2017-10-28 | End: 2018-01-01

## 2017-11-15 NOTE — PROGRESS NOTES
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  He remains on medication for his prostate which is in part contributing to his orthostasis.  I have placed him on Midrin he and this has helped somewhat but he still has periods of feeling fatigued and lightheaded.    His pacemaker was checked today and appears to be working fine.  His blood pressure however is borderline.    Past Medical History:   Diagnosis Date   • Arrhythmia    • Atherosclerotic vascular disease    • Benign prostatic hypertrophy    • Bladder calculus     SEES DR. MOYA - UROL   • Bradycardia    • Cardiomegaly    • History of hepatitis     AGE 21   • Hyperlipidemia    • Left inguinal hernia    • Limb swelling    • Peripheral edema    • Skin lesion    • Small bowel obstruction    • Weakness          Past Surgical History:   Procedure Laterality Date   • CARDIAC PACEMAKER PLACEMENT      Octoberâ€“2014â€“Dr. Allison and associates.   • CATARACT EXTRACTION     • COLONOSCOPY      €“normalâ€“Dr. Ahmet Harvey (surgery)   • HERNIA REPAIR      €“Dr. Ahmet Johnson€“patient had surgery for a left inguinal hernia however at the time of surgery no hernia was found either internally or externally according to the surgical report.   • TOTAL SHOULDER ARTHROPLASTY W/ DISTAL CLAVICLE EXCISION Right 5/15/2017    Procedure: Open Reduction and Internal Fixation Proximal Humerus;  Surgeon: Hugo Way MD;  Location: [facility];  Service:            Current Outpatient Prescriptions:   •  alfuzosin (UROXATRAL) 10 MG 24 hr tablet, Take 1 tablet  "by mouth Daily., Disp: , Rfl: 3  •  atorvastatin (LIPITOR) 40 MG tablet, Take 0.5 tablets by mouth Daily., Disp: 15 tablet, Rfl: 1  •  finasteride (PROSCAR) 5 MG tablet, Take 5 mg by mouth Daily., Disp: , Rfl:   •  ibuprofen (ADVIL,MOTRIN) 200 MG tablet, Take 200 mg by mouth Every 6 (Six) Hours As Needed for Mild Pain (1-3). HOLD PRIOR TO SURG, Disp: , Rfl:   •  midodrine (PROAMATINE) 2.5 MG tablet, Take 1 tablet by mouth 3 (Three) Times a Day., Disp: 180 tablet, Rfl: 3  •  Multiple Vitamins-Minerals (MULTI COMPLETE PO), Take 1 tablet by mouth Daily., Disp: , Rfl:   •  saw palmetto 160 MG capsule, Take 160 mg by mouth Daily., Disp: , Rfl:       Social History     Social History   • Marital status:      Spouse name: N/A   • Number of children: N/A   • Years of education: N/A     Occupational History   • Not on file.     Social History Main Topics   • Smoking status: Never Smoker   • Smokeless tobacco: Never Used   • Alcohol use No      Comment: caffeine use   • Drug use: No   • Sexual activity: Defer     Other Topics Concern   • Not on file     Social History Narrative         Review of Systems   Constitution: Positive for malaise/fatigue.   HENT: Negative.    Eyes: Negative.    Cardiovascular: Negative.    Respiratory: Negative.    Endocrine: Negative.    Skin: Negative.    Musculoskeletal: Negative.    Gastrointestinal: Negative.    Neurological: Positive for light-headedness.   Psychiatric/Behavioral: Negative.        Procedures    Procedures       Objective:    /70  Pulse 67  Ht 73\" (185.4 cm)  Wt 172 lb (78 kg)  BMI 22.69 kg/m2        Physical Exam   Constitutional: He is oriented to person, place, and time. He appears well-developed and well-nourished.   HENT:   Head: Normocephalic.   Eyes: Pupils are equal, round, and reactive to light.   Neck: Normal range of motion. No JVD present. Carotid bruit is not present. No thyromegaly present.   Cardiovascular: Normal rate, regular rhythm, S1 normal, " S2 normal, normal heart sounds and intact distal pulses.  Exam reveals no gallop and no friction rub.    No murmur heard.  Pulmonary/Chest: Effort normal and breath sounds normal.   Abdominal: Soft. Bowel sounds are normal.   Musculoskeletal: He exhibits no edema.   Neurological: He is alert and oriented to person, place, and time.   Skin: Skin is warm, dry and intact. No erythema.   Psychiatric: He has a normal mood and affect.   Vitals reviewed.          Assessment:       Diagnosis Plan   1. Orthostatic hypotension     2. Bradycardia              Plan:     Increase midodrine to 3 times a day.  The patient will call in a every week somewhat me know how his doing.  He may make further adjustments.

## 2018-01-01 ENCOUNTER — CLINICAL SUPPORT NO REQUIREMENTS (OUTPATIENT)
Dept: CARDIOLOGY | Facility: CLINIC | Age: 83
End: 2018-01-01

## 2018-01-01 ENCOUNTER — HOSPITAL ENCOUNTER (OUTPATIENT)
Dept: CT IMAGING | Facility: HOSPITAL | Age: 83
Discharge: HOME | End: 2018-08-03
Attending: PSYCHIATRY & NEUROLOGY | Admitting: PSYCHIATRY & NEUROLOGY

## 2018-01-01 ENCOUNTER — OFFICE VISIT (OUTPATIENT)
Dept: NEUROLOGY | Facility: CLINIC | Age: 83
End: 2018-01-01

## 2018-01-01 ENCOUNTER — ANESTHESIA (OUTPATIENT)
Dept: PERIOP | Facility: HOSPITAL | Age: 83
End: 2018-01-01

## 2018-01-01 ENCOUNTER — ANESTHESIA EVENT (OUTPATIENT)
Dept: PERIOP | Facility: HOSPITAL | Age: 83
End: 2018-01-01

## 2018-01-01 ENCOUNTER — OFFICE VISIT (OUTPATIENT)
Dept: INTERNAL MEDICINE | Age: 83
End: 2018-01-01

## 2018-01-01 ENCOUNTER — LAB REQUISITION (OUTPATIENT)
Dept: LAB | Facility: HOSPITAL | Age: 83
End: 2018-01-01

## 2018-01-01 ENCOUNTER — TELEPHONE (OUTPATIENT)
Dept: NEUROLOGY | Facility: CLINIC | Age: 83
End: 2018-01-01

## 2018-01-01 ENCOUNTER — APPOINTMENT (OUTPATIENT)
Dept: PREADMISSION TESTING | Facility: HOSPITAL | Age: 83
End: 2018-01-01

## 2018-01-01 ENCOUNTER — HOSPITAL ENCOUNTER (OUTPATIENT)
Facility: HOSPITAL | Age: 83
Discharge: HOME | End: 2018-02-18
Attending: UROLOGY | Admitting: UROLOGY

## 2018-01-01 ENCOUNTER — LAB (OUTPATIENT)
Dept: LAB | Facility: HOSPITAL | Age: 83
End: 2018-01-01

## 2018-01-01 ENCOUNTER — TELEPHONE (OUTPATIENT)
Dept: INTERNAL MEDICINE | Age: 83
End: 2018-01-01

## 2018-01-01 ENCOUNTER — OFFICE VISIT (OUTPATIENT)
Dept: CARDIOLOGY | Facility: CLINIC | Age: 83
End: 2018-01-01

## 2018-01-01 VITALS
DIASTOLIC BLOOD PRESSURE: 80 MMHG | BODY MASS INDEX: 22.8 KG/M2 | HEIGHT: 73 IN | HEIGHT: 73 IN | HEART RATE: 94 BPM | WEIGHT: 172 LBS | OXYGEN SATURATION: 97 % | BODY MASS INDEX: 21.87 KG/M2 | WEIGHT: 165 LBS | SYSTOLIC BLOOD PRESSURE: 130 MMHG

## 2018-01-01 VITALS
WEIGHT: 171 LBS | BODY MASS INDEX: 22.66 KG/M2 | HEART RATE: 75 BPM | OXYGEN SATURATION: 98 % | HEIGHT: 73 IN | RESPIRATION RATE: 13 BRPM | TEMPERATURE: 96.9 F | SYSTOLIC BLOOD PRESSURE: 108 MMHG | DIASTOLIC BLOOD PRESSURE: 60 MMHG

## 2018-01-01 VITALS
HEIGHT: 73 IN | RESPIRATION RATE: 16 BRPM | WEIGHT: 178.19 LBS | TEMPERATURE: 97.3 F | SYSTOLIC BLOOD PRESSURE: 115 MMHG | HEART RATE: 81 BPM | DIASTOLIC BLOOD PRESSURE: 73 MMHG | OXYGEN SATURATION: 99 % | BODY MASS INDEX: 23.62 KG/M2

## 2018-01-01 VITALS
HEART RATE: 71 BPM | DIASTOLIC BLOOD PRESSURE: 62 MMHG | HEIGHT: 73 IN | WEIGHT: 165 LBS | BODY MASS INDEX: 21.87 KG/M2 | SYSTOLIC BLOOD PRESSURE: 118 MMHG | OXYGEN SATURATION: 96 %

## 2018-01-01 VITALS
TEMPERATURE: 97.9 F | DIASTOLIC BLOOD PRESSURE: 76 MMHG | HEART RATE: 62 BPM | SYSTOLIC BLOOD PRESSURE: 117 MMHG | WEIGHT: 180 LBS | OXYGEN SATURATION: 100 % | RESPIRATION RATE: 16 BRPM | BODY MASS INDEX: 23.86 KG/M2 | HEIGHT: 73 IN

## 2018-01-01 VITALS
TEMPERATURE: 97.4 F | WEIGHT: 178.6 LBS | BODY MASS INDEX: 23.67 KG/M2 | HEIGHT: 73 IN | HEART RATE: 68 BPM | OXYGEN SATURATION: 97 %

## 2018-01-01 VITALS
SYSTOLIC BLOOD PRESSURE: 108 MMHG | BODY MASS INDEX: 22.8 KG/M2 | OXYGEN SATURATION: 98 % | HEART RATE: 65 BPM | WEIGHT: 172 LBS | DIASTOLIC BLOOD PRESSURE: 60 MMHG | HEIGHT: 73 IN | RESPIRATION RATE: 12 BRPM | TEMPERATURE: 95.9 F

## 2018-01-01 VITALS
HEIGHT: 73 IN | DIASTOLIC BLOOD PRESSURE: 62 MMHG | BODY MASS INDEX: 22.8 KG/M2 | SYSTOLIC BLOOD PRESSURE: 102 MMHG | WEIGHT: 172 LBS | HEART RATE: 65 BPM

## 2018-01-01 DIAGNOSIS — I95.1 ORTHOSTATIC HYPOTENSION: ICD-10-CM

## 2018-01-01 DIAGNOSIS — M65.341 TRIGGER RING FINGER OF RIGHT HAND: ICD-10-CM

## 2018-01-01 DIAGNOSIS — G60.9 IDIOPATHIC PERIPHERAL NEUROPATHY: Primary | ICD-10-CM

## 2018-01-01 DIAGNOSIS — G20 PARKINSON'S DISEASE (HCC): ICD-10-CM

## 2018-01-01 DIAGNOSIS — I95.1 ORTHOSTATIC HYPOTENSION: Primary | ICD-10-CM

## 2018-01-01 DIAGNOSIS — I49.5 SICK SINUS SYNDROME (HCC): Primary | ICD-10-CM

## 2018-01-01 DIAGNOSIS — M65.341 TRIGGER RING FINGER OF RIGHT HAND: Primary | ICD-10-CM

## 2018-01-01 DIAGNOSIS — R73.9 HYPERGLYCEMIA: ICD-10-CM

## 2018-01-01 DIAGNOSIS — G60.9 IDIOPATHIC PERIPHERAL NEUROPATHY: ICD-10-CM

## 2018-01-01 DIAGNOSIS — I95.1 ORTHOSTASIS: Primary | ICD-10-CM

## 2018-01-01 DIAGNOSIS — N39.0 URINARY TRACT INFECTION: ICD-10-CM

## 2018-01-01 DIAGNOSIS — N21.0 BLADDER STONES: ICD-10-CM

## 2018-01-01 DIAGNOSIS — E78.2 MIXED HYPERLIPIDEMIA: Primary | ICD-10-CM

## 2018-01-01 DIAGNOSIS — G20 PARKINSON'S DISEASE (HCC): Primary | ICD-10-CM

## 2018-01-01 DIAGNOSIS — R00.1 BRADYCARDIA: Primary | ICD-10-CM

## 2018-01-01 DIAGNOSIS — R53.83 FATIGUE, UNSPECIFIED TYPE: ICD-10-CM

## 2018-01-01 DIAGNOSIS — R53.1 WEAKNESS GENERALIZED: ICD-10-CM

## 2018-01-01 DIAGNOSIS — R26.81 GENERAL UNSTEADINESS: ICD-10-CM

## 2018-01-01 DIAGNOSIS — E78.2 MIXED HYPERLIPIDEMIA: ICD-10-CM

## 2018-01-01 LAB
ALBUMIN SERPL-MCNC: 3.5 G/DL (ref 2.9–4.4)
ALBUMIN SERPL-MCNC: 3.8 G/DL (ref 3.5–5.2)
ALBUMIN/GLOB SERPL: 0.8 {RATIO} (ref 0.7–1.7)
ALBUMIN/GLOB SERPL: 1 G/DL
ALP SERPL-CCNC: 97 U/L (ref 39–117)
ALPHA1 GLOB FLD ELPH-MCNC: 0.2 G/DL (ref 0–0.4)
ALPHA2 GLOB SERPL ELPH-MCNC: 0.7 G/DL (ref 0.4–1)
ALT SERPL-CCNC: 10 U/L (ref 1–41)
AMORPH URATE CRY URNS QL MICRO: ABNORMAL /HPF
ANION GAP SERPL CALCULATED.3IONS-SCNC: 10.6 MMOL/L
ANION GAP SERPL CALCULATED.3IONS-SCNC: 10.7 MMOL/L
ANION GAP SERPL CALCULATED.3IONS-SCNC: 8.7 MMOL/L
ANION GAP SERPL CALCULATED.3IONS-SCNC: 9 MMOL/L
ANION GAP SERPL CALCULATED.3IONS-SCNC: 9.7 MMOL/L
ANION GAP SERPL CALCULATED.3IONS-SCNC: 9.7 MMOL/L
ARSENIC BLD-MCNC: 5 UG/L (ref 2–23)
AST SERPL-CCNC: 21 U/L (ref 1–40)
B-GLOBULIN SERPL ELPH-MCNC: 0.9 G/DL (ref 0.7–1.3)
BACTERIA SPEC AEROBE CULT: NORMAL
BACTERIA UR QL AUTO: ABNORMAL /HPF
BASOPHILS # BLD AUTO: 0.01 10*3/MM3 (ref 0–0.2)
BASOPHILS # BLD AUTO: 0.01 10*3/MM3 (ref 0–0.2)
BASOPHILS # BLD AUTO: 0.03 10*3/MM3 (ref 0–0.2)
BASOPHILS # BLD AUTO: 0.04 10*3/MM3 (ref 0–0.2)
BASOPHILS NFR BLD AUTO: 0.1 % (ref 0–1.5)
BASOPHILS NFR BLD AUTO: 0.1 % (ref 0–1.5)
BASOPHILS NFR BLD AUTO: 0.3 % (ref 0–1.5)
BASOPHILS NFR BLD AUTO: 0.4 % (ref 0–1.5)
BASOPHILS NFR BLD AUTO: 0.5 % (ref 0–1.5)
BASOPHILS NFR BLD AUTO: 0.5 % (ref 0–1.5)
BILIRUB SERPL-MCNC: 0.4 MG/DL (ref 0.1–1.2)
BILIRUB UR QL STRIP: NEGATIVE
BUN BLD-MCNC: 18 MG/DL (ref 8–23)
BUN BLD-MCNC: 21 MG/DL (ref 8–23)
BUN BLD-MCNC: 25 MG/DL (ref 8–23)
BUN BLD-MCNC: 27 MG/DL (ref 8–23)
BUN BLD-MCNC: 28 MG/DL (ref 8–23)
BUN BLD-MCNC: 30 MG/DL (ref 8–23)
BUN SERPL-MCNC: 29 MG/DL (ref 8–23)
BUN/CREAT SERPL: 19.1 (ref 7–25)
BUN/CREAT SERPL: 20 (ref 7–25)
BUN/CREAT SERPL: 20.9 (ref 7–25)
BUN/CREAT SERPL: 22.3 (ref 7–25)
BUN/CREAT SERPL: 22.9 (ref 7–25)
BUN/CREAT SERPL: 24.5 (ref 7–25)
BUN/CREAT SERPL: 26.2 (ref 7–25)
CA PHOS CRY STONE QL IR: 20 %
CALCIUM SERPL-MCNC: 9.1 MG/DL (ref 8.6–10.5)
CALCIUM SPEC-SCNC: 8.1 MG/DL (ref 8.6–10.5)
CALCIUM SPEC-SCNC: 8.2 MG/DL (ref 8.6–10.5)
CALCIUM SPEC-SCNC: 8.3 MG/DL (ref 8.6–10.5)
CALCIUM SPEC-SCNC: 8.4 MG/DL (ref 8.6–10.5)
CALCIUM SPEC-SCNC: 8.4 MG/DL (ref 8.6–10.5)
CALCIUM SPEC-SCNC: 9.4 MG/DL (ref 8.6–10.5)
CHLORIDE SERPL-SCNC: 103 MMOL/L (ref 98–107)
CHLORIDE SERPL-SCNC: 104 MMOL/L (ref 98–107)
CHLORIDE SERPL-SCNC: 105 MMOL/L (ref 98–107)
CHLORIDE SERPL-SCNC: 106 MMOL/L (ref 98–107)
CHLORIDE SERPL-SCNC: 106 MMOL/L (ref 98–107)
CHLORIDE SERPL-SCNC: 107 MMOL/L (ref 98–107)
CHLORIDE SERPL-SCNC: 107 MMOL/L (ref 98–107)
CHOLEST SERPL-MCNC: 150 MG/DL (ref 0–200)
CLARITY UR: ABNORMAL
CO2 SERPL-SCNC: 21.4 MMOL/L (ref 22–29)
CO2 SERPL-SCNC: 23.3 MMOL/L (ref 22–29)
CO2 SERPL-SCNC: 25.3 MMOL/L (ref 22–29)
CO2 SERPL-SCNC: 26 MMOL/L (ref 22–29)
CO2 SERPL-SCNC: 28.9 MMOL/L (ref 22–29)
COD CRY STONE QL IR: 20 %
COLOR STONE: NORMAL
COLOR UR: YELLOW
COM CRY STONE QL IR: 60 %
COMPN STONE: NORMAL
COPPER SERPL-MCNC: 100 UG/DL (ref 72–166)
CREAT BLD-MCNC: 0.94 MG/DL (ref 0.76–1.27)
CREAT BLD-MCNC: 1.05 MG/DL (ref 0.76–1.27)
CREAT BLD-MCNC: 1.07 MG/DL (ref 0.76–1.27)
CREAT BLD-MCNC: 1.1 MG/DL (ref 0.76–1.27)
CREAT BLD-MCNC: 1.12 MG/DL (ref 0.76–1.27)
CREAT BLD-MCNC: 1.31 MG/DL (ref 0.76–1.27)
CREAT SERPL-MCNC: 1.39 MG/DL (ref 0.76–1.27)
CRYOGLOB SER QL 1D COLD INC: NORMAL
CYTO UR: NORMAL
DEPRECATED RDW RBC AUTO: 49.6 FL (ref 37–54)
DEPRECATED RDW RBC AUTO: 49.9 FL (ref 37–54)
DEPRECATED RDW RBC AUTO: 50.2 FL (ref 37–54)
DEPRECATED RDW RBC AUTO: 50.5 FL (ref 37–54)
EOSINOPHIL # BLD AUTO: 0 10*3/MM3 (ref 0–0.7)
EOSINOPHIL # BLD AUTO: 0.03 10*3/MM3 (ref 0–0.7)
EOSINOPHIL # BLD AUTO: 0.11 10*3/MM3 (ref 0–0.7)
EOSINOPHIL # BLD AUTO: 0.15 10*3/MM3 (ref 0–0.7)
EOSINOPHIL # BLD AUTO: 0.18 10*3/MM3 (ref 0–0.7)
EOSINOPHIL # BLD AUTO: 0.22 10*3/MM3 (ref 0–0.7)
EOSINOPHIL NFR BLD AUTO: 0 % (ref 0.3–6.2)
EOSINOPHIL NFR BLD AUTO: 0.4 % (ref 0.3–6.2)
EOSINOPHIL NFR BLD AUTO: 1.4 % (ref 0.3–6.2)
EOSINOPHIL NFR BLD AUTO: 1.6 % (ref 0.3–6.2)
EOSINOPHIL NFR BLD AUTO: 2.5 % (ref 0.3–6.2)
EOSINOPHIL NFR BLD AUTO: 3.6 % (ref 0.3–6.2)
ERYTHROCYTE [DISTWIDTH] IN BLOOD BY AUTOMATED COUNT: 14 % (ref 11.5–14.5)
ERYTHROCYTE [DISTWIDTH] IN BLOOD BY AUTOMATED COUNT: 14.1 % (ref 11.5–14.5)
ERYTHROCYTE [DISTWIDTH] IN BLOOD BY AUTOMATED COUNT: 14.2 % (ref 11.5–14.5)
ERYTHROCYTE [DISTWIDTH] IN BLOOD BY AUTOMATED COUNT: 14.2 % (ref 11.5–14.5)
ERYTHROCYTE [DISTWIDTH] IN BLOOD BY AUTOMATED COUNT: 14.3 % (ref 11.5–14.5)
ERYTHROCYTE [DISTWIDTH] IN BLOOD BY AUTOMATED COUNT: 14.3 % (ref 11.5–14.5)
ERYTHROCYTE [DISTWIDTH] IN BLOOD BY AUTOMATED COUNT: 14.4 % (ref 11.5–14.5)
ERYTHROCYTE [SEDIMENTATION RATE] IN BLOOD: 61 MM/HR (ref 0–20)
FOLATE SERPL-MCNC: >20 NG/ML (ref 4.78–24.2)
GAMMA GLOB SERPL ELPH-MCNC: 2.4 G/DL (ref 0.4–1.8)
GFR SERPL CREATININE-BSD FRML MDRD: 52 ML/MIN/1.73
GFR SERPL CREATININE-BSD FRML MDRD: 63 ML/MIN/1.73
GFR SERPL CREATININE-BSD FRML MDRD: 64 ML/MIN/1.73
GFR SERPL CREATININE-BSD FRML MDRD: 66 ML/MIN/1.73
GFR SERPL CREATININE-BSD FRML MDRD: 67 ML/MIN/1.73
GFR SERPL CREATININE-BSD FRML MDRD: 77 ML/MIN/1.73
GFR SERPLBLD CREATININE-BSD FMLA CKD-EPI: 49 ML/MIN/1.73
GFR SERPLBLD CREATININE-BSD FMLA CKD-EPI: 59 ML/MIN/1.73
GLOBULIN SER CALC-MCNC: 3.8 GM/DL
GLOBULIN SER CALC-MCNC: 4.2 G/DL (ref 2.2–3.9)
GLUCOSE BLD-MCNC: 104 MG/DL (ref 65–99)
GLUCOSE BLD-MCNC: 110 MG/DL (ref 65–99)
GLUCOSE BLD-MCNC: 87 MG/DL (ref 65–99)
GLUCOSE BLD-MCNC: 89 MG/DL (ref 65–99)
GLUCOSE BLD-MCNC: 92 MG/DL (ref 65–99)
GLUCOSE BLD-MCNC: 98 MG/DL (ref 65–99)
GLUCOSE SERPL-MCNC: 96 MG/DL (ref 65–99)
GLUCOSE UR STRIP-MCNC: NEGATIVE MG/DL
HBA1C MFR BLD: 5.1 % (ref 4.8–5.6)
HCT VFR BLD AUTO: 34.8 % (ref 40.4–52.2)
HCT VFR BLD AUTO: 35 % (ref 40.4–52.2)
HCT VFR BLD AUTO: 37.6 % (ref 40.4–52.2)
HCT VFR BLD AUTO: 38.2 % (ref 40.4–52.2)
HCT VFR BLD AUTO: 38.6 % (ref 40.4–52.2)
HCT VFR BLD AUTO: 40.4 % (ref 40.4–52.2)
HCT VFR BLD AUTO: 40.9 % (ref 40.4–52.2)
HDLC SERPL-MCNC: 39 MG/DL (ref 40–60)
HGB BLD-MCNC: 11 G/DL (ref 13.7–17.6)
HGB BLD-MCNC: 11 G/DL (ref 13.7–17.6)
HGB BLD-MCNC: 11.7 G/DL (ref 13.7–17.6)
HGB BLD-MCNC: 12 G/DL (ref 13.7–17.6)
HGB BLD-MCNC: 12 G/DL (ref 13.7–17.6)
HGB BLD-MCNC: 12.6 G/DL (ref 13.7–17.6)
HGB BLD-MCNC: 12.7 G/DL (ref 13.7–17.6)
HGB UR QL STRIP.AUTO: ABNORMAL
HYALINE CASTS UR QL AUTO: ABNORMAL /LPF
IGA SERPL-MCNC: 297 MG/DL (ref 61–437)
IGG SERPL-MCNC: 2173 MG/DL (ref 700–1600)
IGM SERPL-MCNC: 406 MG/DL (ref 15–143)
IMM GRANULOCYTES # BLD: 0 10*3/MM3 (ref 0–0.03)
IMM GRANULOCYTES # BLD: 0.02 10*3/MM3 (ref 0–0.03)
IMM GRANULOCYTES # BLD: 0.02 10*3/MM3 (ref 0–0.03)
IMM GRANULOCYTES NFR BLD: 0 % (ref 0–0.5)
IMM GRANULOCYTES NFR BLD: 0.2 % (ref 0–0.5)
IMM GRANULOCYTES NFR BLD: 0.3 % (ref 0–0.5)
KETONES UR QL STRIP: NEGATIVE
LAB AP CASE REPORT: NORMAL
LDLC SERPL CALC-MCNC: 95 MG/DL (ref 0–100)
LEAD BLD-MCNC: 1 UG/DL (ref 0–4)
LEUKOCYTE ESTERASE UR QL STRIP.AUTO: ABNORMAL
LYMPHOCYTES # BLD AUTO: 0.88 10*3/MM3 (ref 0.9–4.8)
LYMPHOCYTES # BLD AUTO: 1.53 10*3/MM3 (ref 0.9–4.8)
LYMPHOCYTES # BLD AUTO: 1.82 10*3/MM3 (ref 0.9–4.8)
LYMPHOCYTES # BLD AUTO: 1.88 10*3/MM3 (ref 0.9–4.8)
LYMPHOCYTES # BLD AUTO: 2.09 10*3/MM3 (ref 0.9–4.8)
LYMPHOCYTES # BLD AUTO: 2.51 10*3/MM3 (ref 0.9–4.8)
LYMPHOCYTES NFR BLD AUTO: 10.9 % (ref 19.6–45.3)
LYMPHOCYTES NFR BLD AUTO: 13.3 % (ref 19.6–45.3)
LYMPHOCYTES NFR BLD AUTO: 25 % (ref 19.6–45.3)
LYMPHOCYTES NFR BLD AUTO: 27 % (ref 19.6–45.3)
LYMPHOCYTES NFR BLD AUTO: 27.4 % (ref 19.6–45.3)
LYMPHOCYTES NFR BLD AUTO: 30.8 % (ref 19.6–45.3)
Lab: ABNORMAL
Lab: NORMAL
M-SPIKE: ABNORMAL G/DL
MCH RBC QN AUTO: 30 PG (ref 27–32.7)
MCH RBC QN AUTO: 30 PG (ref 27–32.7)
MCH RBC QN AUTO: 30.2 PG (ref 27–32.7)
MCH RBC QN AUTO: 30.9 PG (ref 27–32.7)
MCHC RBC AUTO-ENTMCNC: 31.1 G/DL (ref 32.6–36.4)
MCHC RBC AUTO-ENTMCNC: 31.2 G/DL (ref 32.6–36.4)
MCHC RBC AUTO-ENTMCNC: 31.4 G/DL (ref 32.6–36.4)
MCHC RBC AUTO-ENTMCNC: 31.4 G/DL (ref 32.6–36.4)
MCHC RBC AUTO-ENTMCNC: 31.6 G/DL (ref 32.6–36.4)
MCV RBC AUTO: 95.4 FL (ref 79.8–96.2)
MCV RBC AUTO: 95.6 FL (ref 79.8–96.2)
MCV RBC AUTO: 96.2 FL (ref 79.8–96.2)
MCV RBC AUTO: 96.5 FL (ref 79.8–96.2)
MCV RBC AUTO: 96.9 FL (ref 79.8–96.2)
MCV RBC AUTO: 96.9 FL (ref 79.8–96.2)
MCV RBC AUTO: 99.5 FL (ref 79.8–96.2)
MERCURY BLD-MCNC: NORMAL UG/L (ref 0–14.9)
MONOCYTES # BLD AUTO: 0.04 10*3/MM3 (ref 0.2–1.2)
MONOCYTES # BLD AUTO: 0.21 10*3/MM3 (ref 0.2–1.2)
MONOCYTES # BLD AUTO: 0.23 10*3/MM3 (ref 0.2–1.2)
MONOCYTES # BLD AUTO: 0.27 10*3/MM3 (ref 0.2–1.2)
MONOCYTES # BLD AUTO: 0.36 10*3/MM3 (ref 0.2–1.2)
MONOCYTES # BLD AUTO: 0.36 10*3/MM3 (ref 0.2–1.2)
MONOCYTES NFR BLD AUTO: 0.5 % (ref 5–12)
MONOCYTES NFR BLD AUTO: 3 % (ref 5–12)
MONOCYTES NFR BLD AUTO: 3.1 % (ref 5–12)
MONOCYTES NFR BLD AUTO: 3.4 % (ref 5–12)
MONOCYTES NFR BLD AUTO: 3.7 % (ref 5–12)
MONOCYTES NFR BLD AUTO: 3.9 % (ref 5–12)
NEUTROPHILS # BLD AUTO: 3.77 10*3/MM3 (ref 1.9–8.1)
NEUTROPHILS # BLD AUTO: 4.97 10*3/MM3 (ref 1.9–8.1)
NEUTROPHILS # BLD AUTO: 5.25 10*3/MM3 (ref 1.9–8.1)
NEUTROPHILS # BLD AUTO: 6.12 10*3/MM3 (ref 1.9–8.1)
NEUTROPHILS # BLD AUTO: 7.08 10*3/MM3 (ref 1.9–8.1)
NEUTROPHILS # BLD AUTO: 9.59 10*3/MM3 (ref 1.9–8.1)
NEUTROPHILS NFR BLD AUTO: 61.7 % (ref 42.7–76)
NEUTROPHILS NFR BLD AUTO: 66.8 % (ref 42.7–76)
NEUTROPHILS NFR BLD AUTO: 67.9 % (ref 42.7–76)
NEUTROPHILS NFR BLD AUTO: 68.3 % (ref 42.7–76)
NEUTROPHILS NFR BLD AUTO: 83.3 % (ref 42.7–76)
NEUTROPHILS NFR BLD AUTO: 88.1 % (ref 42.7–76)
NIDUS STONE QL: NORMAL
NITRITE UR QL STRIP: POSITIVE
PATH REPORT.COMMENTS IMP SPEC: NORMAL
PATH REPORT.FINAL DX SPEC: NORMAL
PATH REPORT.GROSS SPEC: NORMAL
PH UR STRIP.AUTO: 6 [PH] (ref 5–8)
PLATELET # BLD AUTO: 182 10*3/MM3 (ref 140–500)
PLATELET # BLD AUTO: 182 10*3/MM3 (ref 140–500)
PLATELET # BLD AUTO: 184 10*3/MM3 (ref 140–500)
PLATELET # BLD AUTO: 189 10*3/MM3 (ref 140–500)
PLATELET # BLD AUTO: 191 10*3/MM3 (ref 140–500)
PLATELET # BLD AUTO: 202 10*3/MM3 (ref 140–500)
PLATELET # BLD AUTO: 217 10*3/MM3 (ref 140–500)
PMV BLD AUTO: 9.6 FL (ref 6–12)
PMV BLD AUTO: 9.8 FL (ref 6–12)
PMV BLD AUTO: 9.9 FL (ref 6–12)
PMV BLD AUTO: 9.9 FL (ref 6–12)
POTASSIUM BLD-SCNC: 4.1 MMOL/L (ref 3.5–5.2)
POTASSIUM BLD-SCNC: 4.2 MMOL/L (ref 3.5–5.2)
POTASSIUM BLD-SCNC: 4.3 MMOL/L (ref 3.5–5.2)
POTASSIUM BLD-SCNC: 4.4 MMOL/L (ref 3.5–5.2)
POTASSIUM BLD-SCNC: 4.4 MMOL/L (ref 3.5–5.2)
POTASSIUM BLD-SCNC: 5.8 MMOL/L (ref 3.5–5.2)
POTASSIUM SERPL-SCNC: 4.8 MMOL/L (ref 3.5–5.2)
PROT PATTERN SERPL ELPH-IMP: ABNORMAL
PROT PATTERN SERPL IFE-IMP: ABNORMAL
PROT SERPL-MCNC: 7.6 G/DL (ref 6–8.5)
PROT SERPL-MCNC: 7.7 G/DL (ref 6–8.5)
PROT UR QL STRIP: ABNORMAL
RBC # BLD AUTO: 3.64 10*6/MM3 (ref 4.6–6)
RBC # BLD AUTO: 3.67 10*6/MM3 (ref 4.6–6)
RBC # BLD AUTO: 3.88 10*6/MM3 (ref 4.6–6)
RBC # BLD AUTO: 3.97 10*6/MM3 (ref 4.6–6)
RBC # BLD AUTO: 4 10*6/MM3 (ref 4.6–6)
RBC # BLD AUTO: 4.11 10*6/MM3 (ref 4.6–6)
RBC # BLD AUTO: 4.17 10*6/MM3 (ref 4.6–6)
RBC # UR: ABNORMAL /HPF
REF LAB TEST METHOD: ABNORMAL
RPR SER QL: NORMAL
SIZE STONE: NORMAL MM
SODIUM BLD-SCNC: 139 MMOL/L (ref 136–145)
SODIUM BLD-SCNC: 141 MMOL/L (ref 136–145)
SODIUM SERPL-SCNC: 142 MMOL/L (ref 136–145)
SP GR UR STRIP: 1.02 (ref 1–1.03)
SQUAMOUS #/AREA URNS HPF: ABNORMAL /HPF
T4 FREE SERPL-MCNC: 1.17 NG/DL (ref 0.93–1.7)
T4 FREE SERPL-MCNC: 1.23 NG/DL (ref 0.93–1.7)
TRIGL SERPL-MCNC: 79 MG/DL (ref 0–150)
TSH SERPL DL<=0.005 MIU/L-ACNC: 1.58 MIU/ML (ref 0.27–4.2)
TSH SERPL DL<=0.05 MIU/L-ACNC: 1.96 MIU/ML (ref 0.27–4.2)
UROBILINOGEN UR QL STRIP: ABNORMAL
VIT B12 BLD-MCNC: 806 PG/ML (ref 211–946)
VLDLC SERPL CALC-MCNC: 15.8 MG/DL (ref 5–40)
WBC # BLD AUTO: 7.73 10*3/MM3 (ref 4.5–10.7)
WBC NRBC COR # BLD: 11.51 10*3/MM3 (ref 4.5–10.7)
WBC NRBC COR # BLD: 6.11 10*3/MM3 (ref 4.5–10.7)
WBC NRBC COR # BLD: 7.28 10*3/MM3 (ref 4.5–10.7)
WBC NRBC COR # BLD: 7.74 10*3/MM3 (ref 4.5–10.7)
WBC NRBC COR # BLD: 8.04 10*3/MM3 (ref 4.5–10.7)
WBC NRBC COR # BLD: 9.17 10*3/MM3 (ref 4.5–10.7)
WBC UR QL AUTO: ABNORMAL /HPF
WT STONE: 109.7 MG

## 2018-01-01 PROCEDURE — 82595 ASSAY OF CRYOGLOBULIN: CPT

## 2018-01-01 PROCEDURE — 99213 OFFICE O/P EST LOW 20 MIN: CPT | Performed by: INTERNAL MEDICINE

## 2018-01-01 PROCEDURE — G0378 HOSPITAL OBSERVATION PER HR: HCPCS

## 2018-01-01 PROCEDURE — 82607 VITAMIN B-12: CPT | Performed by: PSYCHIATRY & NEUROLOGY

## 2018-01-01 PROCEDURE — 85025 COMPLETE CBC W/AUTO DIFF WBC: CPT | Performed by: UROLOGY

## 2018-01-01 PROCEDURE — 25010000002 CEFTRIAXONE PER 250 MG: Performed by: UROLOGY

## 2018-01-01 PROCEDURE — 93288 INTERROG EVL PM/LDLS PM IP: CPT | Performed by: INTERNAL MEDICINE

## 2018-01-01 PROCEDURE — 80048 BASIC METABOLIC PNL TOTAL CA: CPT | Performed by: UROLOGY

## 2018-01-01 PROCEDURE — 82746 ASSAY OF FOLIC ACID SERUM: CPT | Performed by: PSYCHIATRY & NEUROLOGY

## 2018-01-01 PROCEDURE — 25010000002 DEXAMETHASONE PER 1 MG: Performed by: NURSE ANESTHETIST, CERTIFIED REGISTERED

## 2018-01-01 PROCEDURE — 86592 SYPHILIS TEST NON-TREP QUAL: CPT | Performed by: PSYCHIATRY & NEUROLOGY

## 2018-01-01 PROCEDURE — 36415 COLL VENOUS BLD VENIPUNCTURE: CPT

## 2018-01-01 PROCEDURE — 99214 OFFICE O/P EST MOD 30 MIN: CPT | Performed by: NURSE PRACTITIONER

## 2018-01-01 PROCEDURE — 25010000002 PROPOFOL 10 MG/ML EMULSION: Performed by: NURSE ANESTHETIST, CERTIFIED REGISTERED

## 2018-01-01 PROCEDURE — 86334 IMMUNOFIX E-PHORESIS SERUM: CPT

## 2018-01-01 PROCEDURE — 82175 ASSAY OF ARSENIC: CPT | Performed by: PSYCHIATRY & NEUROLOGY

## 2018-01-01 PROCEDURE — 84439 ASSAY OF FREE THYROXINE: CPT | Performed by: PSYCHIATRY & NEUROLOGY

## 2018-01-01 PROCEDURE — 83825 ASSAY OF MERCURY: CPT | Performed by: PSYCHIATRY & NEUROLOGY

## 2018-01-01 PROCEDURE — 36415 COLL VENOUS BLD VENIPUNCTURE: CPT | Performed by: PSYCHIATRY & NEUROLOGY

## 2018-01-01 PROCEDURE — 84155 ASSAY OF PROTEIN SERUM: CPT | Performed by: PSYCHIATRY & NEUROLOGY

## 2018-01-01 PROCEDURE — 82525 ASSAY OF COPPER: CPT

## 2018-01-01 PROCEDURE — 25010000002 FENTANYL CITRATE (PF) 100 MCG/2ML SOLUTION: Performed by: NURSE ANESTHETIST, CERTIFIED REGISTERED

## 2018-01-01 PROCEDURE — 85652 RBC SED RATE AUTOMATED: CPT | Performed by: PSYCHIATRY & NEUROLOGY

## 2018-01-01 PROCEDURE — 81001 URINALYSIS AUTO W/SCOPE: CPT | Performed by: UROLOGY

## 2018-01-01 PROCEDURE — 70450 CT HEAD/BRAIN W/O DYE: CPT

## 2018-01-01 PROCEDURE — 88305 TISSUE EXAM BY PATHOLOGIST: CPT | Performed by: UROLOGY

## 2018-01-01 PROCEDURE — 99213 OFFICE O/P EST LOW 20 MIN: CPT | Performed by: NURSE PRACTITIONER

## 2018-01-01 PROCEDURE — 83036 HEMOGLOBIN GLYCOSYLATED A1C: CPT | Performed by: PSYCHIATRY & NEUROLOGY

## 2018-01-01 PROCEDURE — 93280 PM DEVICE PROGR EVAL DUAL: CPT | Performed by: INTERNAL MEDICINE

## 2018-01-01 PROCEDURE — 87086 URINE CULTURE/COLONY COUNT: CPT | Performed by: UROLOGY

## 2018-01-01 PROCEDURE — 85027 COMPLETE CBC AUTOMATED: CPT | Performed by: UROLOGY

## 2018-01-01 PROCEDURE — 83655 ASSAY OF LEAD: CPT | Performed by: PSYCHIATRY & NEUROLOGY

## 2018-01-01 PROCEDURE — 99214 OFFICE O/P EST MOD 30 MIN: CPT | Performed by: INTERNAL MEDICINE

## 2018-01-01 PROCEDURE — 84165 PROTEIN E-PHORESIS SERUM: CPT | Performed by: PSYCHIATRY & NEUROLOGY

## 2018-01-01 PROCEDURE — 82784 ASSAY IGA/IGD/IGG/IGM EACH: CPT

## 2018-01-01 PROCEDURE — 99204 OFFICE O/P NEW MOD 45 MIN: CPT | Performed by: PSYCHIATRY & NEUROLOGY

## 2018-01-01 PROCEDURE — 84443 ASSAY THYROID STIM HORMONE: CPT | Performed by: PSYCHIATRY & NEUROLOGY

## 2018-01-01 PROCEDURE — 82360 CALCULUS ASSAY QUANT: CPT | Performed by: UROLOGY

## 2018-01-01 PROCEDURE — 25010000002 ONDANSETRON PER 1 MG: Performed by: NURSE ANESTHETIST, CERTIFIED REGISTERED

## 2018-01-01 RX ORDER — PROMETHAZINE HYDROCHLORIDE 25 MG/1
12.5 TABLET ORAL ONCE AS NEEDED
Status: DISCONTINUED | OUTPATIENT
Start: 2018-01-01 | End: 2018-01-01 | Stop reason: HOSPADM

## 2018-01-01 RX ORDER — MAGNESIUM HYDROXIDE 1200 MG/15ML
LIQUID ORAL AS NEEDED
Status: DISCONTINUED | OUTPATIENT
Start: 2018-01-01 | End: 2018-01-01 | Stop reason: HOSPADM

## 2018-01-01 RX ORDER — PROMETHAZINE HYDROCHLORIDE 25 MG/ML
12.5 INJECTION, SOLUTION INTRAMUSCULAR; INTRAVENOUS ONCE AS NEEDED
Status: DISCONTINUED | OUTPATIENT
Start: 2018-01-01 | End: 2018-01-01 | Stop reason: HOSPADM

## 2018-01-01 RX ORDER — DOCUSATE SODIUM 100 MG/1
100 CAPSULE, LIQUID FILLED ORAL 2 TIMES DAILY PRN
Status: DISCONTINUED | OUTPATIENT
Start: 2018-01-01 | End: 2018-01-01 | Stop reason: HOSPADM

## 2018-01-01 RX ORDER — CEFTRIAXONE SODIUM 1 G/50ML
1 INJECTION, SOLUTION INTRAVENOUS ONCE
Status: COMPLETED | OUTPATIENT
Start: 2018-01-01 | End: 2018-01-01

## 2018-01-01 RX ORDER — SODIUM CHLORIDE 0.9 % (FLUSH) 0.9 %
1-10 SYRINGE (ML) INJECTION AS NEEDED
Status: DISCONTINUED | OUTPATIENT
Start: 2018-01-01 | End: 2018-01-01 | Stop reason: HOSPADM

## 2018-01-01 RX ORDER — DIPHENHYDRAMINE HYDROCHLORIDE 50 MG/ML
12.5 INJECTION INTRAMUSCULAR; INTRAVENOUS
Status: DISCONTINUED | OUTPATIENT
Start: 2018-01-01 | End: 2018-01-01 | Stop reason: HOSPADM

## 2018-01-01 RX ORDER — LIDOCAINE HYDROCHLORIDE 10 MG/ML
0.5 INJECTION, SOLUTION EPIDURAL; INFILTRATION; INTRACAUDAL; PERINEURAL ONCE AS NEEDED
Status: DISCONTINUED | OUTPATIENT
Start: 2018-01-01 | End: 2018-01-01 | Stop reason: HOSPADM

## 2018-01-01 RX ORDER — PROMETHAZINE HYDROCHLORIDE 25 MG/1
25 SUPPOSITORY RECTAL ONCE AS NEEDED
Status: DISCONTINUED | OUTPATIENT
Start: 2018-01-01 | End: 2018-01-01 | Stop reason: HOSPADM

## 2018-01-01 RX ORDER — MIDAZOLAM HYDROCHLORIDE 1 MG/ML
1 INJECTION INTRAMUSCULAR; INTRAVENOUS
Status: DISCONTINUED | OUTPATIENT
Start: 2018-01-01 | End: 2018-01-01 | Stop reason: HOSPADM

## 2018-01-01 RX ORDER — FLUMAZENIL 0.1 MG/ML
0.2 INJECTION INTRAVENOUS AS NEEDED
Status: DISCONTINUED | OUTPATIENT
Start: 2018-01-01 | End: 2018-01-01 | Stop reason: HOSPADM

## 2018-01-01 RX ORDER — FENTANYL CITRATE 50 UG/ML
INJECTION, SOLUTION INTRAMUSCULAR; INTRAVENOUS AS NEEDED
Status: DISCONTINUED | OUTPATIENT
Start: 2018-01-01 | End: 2018-01-01 | Stop reason: SURG

## 2018-01-01 RX ORDER — LIDOCAINE HYDROCHLORIDE 20 MG/ML
INJECTION, SOLUTION INFILTRATION; PERINEURAL AS NEEDED
Status: DISCONTINUED | OUTPATIENT
Start: 2018-01-01 | End: 2018-01-01 | Stop reason: SURG

## 2018-01-01 RX ORDER — LABETALOL HYDROCHLORIDE 5 MG/ML
5 INJECTION, SOLUTION INTRAVENOUS
Status: DISCONTINUED | OUTPATIENT
Start: 2018-01-01 | End: 2018-01-01 | Stop reason: HOSPADM

## 2018-01-01 RX ORDER — ATORVASTATIN CALCIUM 40 MG/1
TABLET, FILM COATED ORAL
Qty: 15 TABLET | Refills: 0 | Status: SHIPPED | OUTPATIENT
Start: 2018-01-01 | End: 2018-01-01

## 2018-01-01 RX ORDER — DOCUSATE SODIUM 100 MG/1
100 CAPSULE, LIQUID FILLED ORAL 2 TIMES DAILY PRN
COMMUNITY

## 2018-01-01 RX ORDER — SODIUM CHLORIDE 9 MG/ML
9 INJECTION, SOLUTION INTRAVENOUS CONTINUOUS
Status: DISCONTINUED | OUTPATIENT
Start: 2018-01-01 | End: 2018-01-01

## 2018-01-01 RX ORDER — MAGNESIUM HYDROXIDE 1200 MG/15ML
3000 LIQUID ORAL ONCE
Status: COMPLETED | OUTPATIENT
Start: 2018-01-01 | End: 2018-01-01

## 2018-01-01 RX ORDER — ONDANSETRON 2 MG/ML
INJECTION INTRAMUSCULAR; INTRAVENOUS AS NEEDED
Status: DISCONTINUED | OUTPATIENT
Start: 2018-01-01 | End: 2018-01-01 | Stop reason: SURG

## 2018-01-01 RX ORDER — EPHEDRINE SULFATE 50 MG/ML
5 INJECTION, SOLUTION INTRAVENOUS ONCE AS NEEDED
Status: DISCONTINUED | OUTPATIENT
Start: 2018-01-01 | End: 2018-01-01 | Stop reason: HOSPADM

## 2018-01-01 RX ORDER — FAMOTIDINE 10 MG/ML
20 INJECTION, SOLUTION INTRAVENOUS ONCE
Status: COMPLETED | OUTPATIENT
Start: 2018-01-01 | End: 2018-01-01

## 2018-01-01 RX ORDER — ATORVASTATIN CALCIUM 40 MG/1
40 TABLET, FILM COATED ORAL EVERY OTHER DAY
COMMUNITY
End: 2018-01-01 | Stop reason: SINTOL

## 2018-01-01 RX ORDER — FENTANYL CITRATE 50 UG/ML
50 INJECTION, SOLUTION INTRAMUSCULAR; INTRAVENOUS
Status: DISCONTINUED | OUTPATIENT
Start: 2018-01-01 | End: 2018-01-01 | Stop reason: HOSPADM

## 2018-01-01 RX ORDER — SODIUM CHLORIDE 9 MG/ML
75 INJECTION, SOLUTION INTRAVENOUS CONTINUOUS
Status: DISCONTINUED | OUTPATIENT
Start: 2018-01-01 | End: 2018-01-01

## 2018-01-01 RX ORDER — NALOXONE HCL 0.4 MG/ML
0.2 VIAL (ML) INJECTION AS NEEDED
Status: DISCONTINUED | OUTPATIENT
Start: 2018-01-01 | End: 2018-01-01 | Stop reason: HOSPADM

## 2018-01-01 RX ORDER — SODIUM CHLORIDE, SODIUM LACTATE, POTASSIUM CHLORIDE, CALCIUM CHLORIDE 600; 310; 30; 20 MG/100ML; MG/100ML; MG/100ML; MG/100ML
9 INJECTION, SOLUTION INTRAVENOUS CONTINUOUS
Status: DISCONTINUED | OUTPATIENT
Start: 2018-01-01 | End: 2018-01-01

## 2018-01-01 RX ORDER — ONDANSETRON 2 MG/ML
4 INJECTION INTRAMUSCULAR; INTRAVENOUS ONCE AS NEEDED
Status: DISCONTINUED | OUTPATIENT
Start: 2018-01-01 | End: 2018-01-01 | Stop reason: HOSPADM

## 2018-01-01 RX ORDER — HYDROMORPHONE HCL 110MG/55ML
0.5 PATIENT CONTROLLED ANALGESIA SYRINGE INTRAVENOUS
Status: DISCONTINUED | OUTPATIENT
Start: 2018-01-01 | End: 2018-01-01 | Stop reason: HOSPADM

## 2018-01-01 RX ORDER — MIDAZOLAM HYDROCHLORIDE 1 MG/ML
2 INJECTION INTRAMUSCULAR; INTRAVENOUS
Status: DISCONTINUED | OUTPATIENT
Start: 2018-01-01 | End: 2018-01-01 | Stop reason: HOSPADM

## 2018-01-01 RX ORDER — ATORVASTATIN CALCIUM 20 MG/1
40 TABLET, FILM COATED ORAL EVERY OTHER DAY
Status: DISCONTINUED | OUTPATIENT
Start: 2018-01-01 | End: 2018-01-01 | Stop reason: HOSPADM

## 2018-01-01 RX ORDER — OXYCODONE AND ACETAMINOPHEN 7.5; 325 MG/1; MG/1
1 TABLET ORAL ONCE AS NEEDED
Status: DISCONTINUED | OUTPATIENT
Start: 2018-01-01 | End: 2018-01-01 | Stop reason: HOSPADM

## 2018-01-01 RX ORDER — FESOTERODINE FUMARATE 4 MG/1
4 TABLET, EXTENDED RELEASE ORAL EVERY OTHER DAY
COMMUNITY

## 2018-01-01 RX ORDER — PROPOFOL 10 MG/ML
VIAL (ML) INTRAVENOUS AS NEEDED
Status: DISCONTINUED | OUTPATIENT
Start: 2018-01-01 | End: 2018-01-01 | Stop reason: SURG

## 2018-01-01 RX ORDER — HYDROCODONE BITARTRATE AND ACETAMINOPHEN 7.5; 325 MG/1; MG/1
1 TABLET ORAL ONCE AS NEEDED
Status: DISCONTINUED | OUTPATIENT
Start: 2018-01-01 | End: 2018-01-01 | Stop reason: HOSPADM

## 2018-01-01 RX ORDER — HYDRALAZINE HYDROCHLORIDE 20 MG/ML
5 INJECTION INTRAMUSCULAR; INTRAVENOUS
Status: DISCONTINUED | OUTPATIENT
Start: 2018-01-01 | End: 2018-01-01 | Stop reason: HOSPADM

## 2018-01-01 RX ORDER — HYDROCODONE BITARTRATE AND ACETAMINOPHEN 5; 325 MG/1; MG/1
1 TABLET ORAL EVERY 4 HOURS PRN
Status: DISCONTINUED | OUTPATIENT
Start: 2018-01-01 | End: 2018-01-01 | Stop reason: HOSPADM

## 2018-01-01 RX ORDER — FINASTERIDE 5 MG/1
5 TABLET, FILM COATED ORAL DAILY
Status: DISCONTINUED | OUTPATIENT
Start: 2018-01-01 | End: 2018-01-01 | Stop reason: HOSPADM

## 2018-01-01 RX ORDER — DEXAMETHASONE SODIUM PHOSPHATE 10 MG/ML
INJECTION INTRAMUSCULAR; INTRAVENOUS AS NEEDED
Status: DISCONTINUED | OUTPATIENT
Start: 2018-01-01 | End: 2018-01-01 | Stop reason: SURG

## 2018-01-01 RX ORDER — PROMETHAZINE HYDROCHLORIDE 25 MG/1
25 TABLET ORAL ONCE AS NEEDED
Status: DISCONTINUED | OUTPATIENT
Start: 2018-01-01 | End: 2018-01-01 | Stop reason: HOSPADM

## 2018-01-01 RX ORDER — MIDODRINE HYDROCHLORIDE 2.5 MG/1
2.5 TABLET ORAL 3 TIMES DAILY
Status: DISCONTINUED | OUTPATIENT
Start: 2018-01-01 | End: 2018-01-01 | Stop reason: HOSPADM

## 2018-01-01 RX ADMIN — CEFTRIAXONE SODIUM 1 G: 1 INJECTION, SOLUTION INTRAVENOUS at 15:55

## 2018-01-01 RX ADMIN — FINASTERIDE 5 MG: 5 TABLET, FILM COATED ORAL at 09:46

## 2018-01-01 RX ADMIN — MIDODRINE HYDROCHLORIDE 2.5 MG: 2.5 TABLET ORAL at 20:51

## 2018-01-01 RX ADMIN — MIDODRINE HYDROCHLORIDE 2.5 MG: 2.5 TABLET ORAL at 08:20

## 2018-01-01 RX ADMIN — FENTANYL CITRATE 50 MCG: 50 INJECTION INTRAMUSCULAR; INTRAVENOUS at 16:00

## 2018-01-01 RX ADMIN — SODIUM CHLORIDE 9 ML/HR: 9 INJECTION, SOLUTION INTRAVENOUS at 15:41

## 2018-01-01 RX ADMIN — MIDODRINE HYDROCHLORIDE 2.5 MG: 2.5 TABLET ORAL at 16:08

## 2018-01-01 RX ADMIN — MIDODRINE HYDROCHLORIDE 2.5 MG: 2.5 TABLET ORAL at 17:14

## 2018-01-01 RX ADMIN — SODIUM CHLORIDE 75 ML/HR: 9 INJECTION, SOLUTION INTRAVENOUS at 01:33

## 2018-01-01 RX ADMIN — ONDANSETRON 4 MG: 2 INJECTION INTRAMUSCULAR; INTRAVENOUS at 17:04

## 2018-01-01 RX ADMIN — SODIUM CHLORIDE 75 ML/HR: 9 INJECTION, SOLUTION INTRAVENOUS at 04:45

## 2018-01-01 RX ADMIN — PROPOFOL 150 MG: 10 INJECTION, EMULSION INTRAVENOUS at 16:00

## 2018-01-01 RX ADMIN — WATER 3000 ML: 100 IRRIGANT IRRIGATION at 20:21

## 2018-01-01 RX ADMIN — MIDODRINE HYDROCHLORIDE 2.5 MG: 2.5 TABLET ORAL at 10:47

## 2018-01-01 RX ADMIN — SODIUM CHLORIDE 75 ML/HR: 9 INJECTION, SOLUTION INTRAVENOUS at 20:21

## 2018-01-01 RX ADMIN — ATORVASTATIN CALCIUM 40 MG: 20 TABLET, FILM COATED ORAL at 08:20

## 2018-01-01 RX ADMIN — LIDOCAINE HYDROCHLORIDE 60 MG: 20 INJECTION, SOLUTION INFILTRATION; PERINEURAL at 16:00

## 2018-01-01 RX ADMIN — FINASTERIDE 5 MG: 5 TABLET, FILM COATED ORAL at 08:20

## 2018-01-01 RX ADMIN — FINASTERIDE 5 MG: 5 TABLET, FILM COATED ORAL at 10:47

## 2018-01-01 RX ADMIN — SODIUM CHLORIDE 75 ML/HR: 9 INJECTION, SOLUTION INTRAVENOUS at 14:35

## 2018-01-01 RX ADMIN — SODIUM CHLORIDE, POTASSIUM CHLORIDE, SODIUM LACTATE AND CALCIUM CHLORIDE 9 ML/HR: 600; 310; 30; 20 INJECTION, SOLUTION INTRAVENOUS at 13:38

## 2018-01-01 RX ADMIN — MIDODRINE HYDROCHLORIDE 2.5 MG: 2.5 TABLET ORAL at 23:47

## 2018-01-01 RX ADMIN — FENTANYL CITRATE 50 MCG: 50 INJECTION INTRAMUSCULAR; INTRAVENOUS at 16:24

## 2018-01-01 RX ADMIN — FAMOTIDINE 20 MG: 10 INJECTION INTRAVENOUS at 14:08

## 2018-01-01 RX ADMIN — ATORVASTATIN CALCIUM 40 MG: 20 TABLET, FILM COATED ORAL at 23:35

## 2018-01-01 RX ADMIN — FINASTERIDE 5 MG: 5 TABLET, FILM COATED ORAL at 23:34

## 2018-01-01 RX ADMIN — SODIUM CHLORIDE 75 ML/HR: 9 INJECTION, SOLUTION INTRAVENOUS at 16:07

## 2018-01-01 RX ADMIN — FENTANYL CITRATE 50 MCG: 50 INJECTION INTRAMUSCULAR; INTRAVENOUS at 17:04

## 2018-01-01 RX ADMIN — MIDODRINE HYDROCHLORIDE 2.5 MG: 2.5 TABLET ORAL at 23:35

## 2018-01-01 RX ADMIN — DEXAMETHASONE SODIUM PHOSPHATE 8 MG: 10 INJECTION INTRAMUSCULAR; INTRAVENOUS at 16:24

## 2018-01-04 DIAGNOSIS — E78.2 MIXED HYPERLIPIDEMIA: ICD-10-CM

## 2018-01-05 RX ORDER — ATORVASTATIN CALCIUM 40 MG/1
TABLET, FILM COATED ORAL
Qty: 15 TABLET | Refills: 0 | Status: SHIPPED | OUTPATIENT
Start: 2018-01-05 | End: 2018-01-01

## 2018-01-17 ENCOUNTER — TELEPHONE (OUTPATIENT)
Dept: CARDIOLOGY | Facility: CLINIC | Age: 83
End: 2018-01-17

## 2018-02-05 NOTE — DISCHARGE INSTRUCTIONS
Take the following medications the morning of surgery with a small sip of water:    MIDODRINE AND UROXATRAL    ARRIVE AT 1:00 PER  DR OFFICE AS STATED FROM OFFICE    General Instructions:  • Do not eat or drink anything after 6:00  AM THE MORNING OF SURGERY  • Infants may have breast milk up to four hours before surgery.  • Infants drinking formula may drink formula up to six hours before surgery.   • Patients who avoid smoking, chewing tobacco and alcohol for 4 weeks prior to surgery have a reduced risk of post-operative complications.  Quit smoking as many days before surgery as you can.  • Do not smoke, use chewing tobacco or drink alcohol the day of surgery.   • If applicable bring your C-PAP/ BI-PAP machine.  • Bring any papers given to you in the doctor’s office.  • Wear clean comfortable clothes and socks.  • Do not wear contact lenses or make-up.  Bring a case for your glasses.   • Bring crutches or walker if applicable.  • Remove all piercings.  Leave jewelry and any other valuables at home.  • Hair extensions with metal clips must be removed prior to surgery.  • The Pre-Admission Testing nurse will instruct you to bring medications if unable to obtain an accurate list in Pre-Admission Testing.        If you were given a blood bank ID arm band remember to bring it with you the day of surgery.    Preventing a Surgical Site Infection:  • For 2 to 3 days before surgery, avoid shaving with a razor because the razor can irritate skin and make it easier to develop an infection.  • The night prior to surgery sleep in a clean bed with clean clothing.  Do not allow pets to sleep with you.  • Shower on the morning of surgery using a fresh bar of anti-bacterial soap (such as Dial) and clean washcloth.  Dry with a clean towel and dress in clean clothing.  • Ask your surgeon if you will be receiving antibiotics prior to surgery.  • Make sure you, your family, and all healthcare providers clean their hands with soap and  water or an alcohol based hand  before caring for you or your wound.    Day of surgery:  Upon arrival, a Pre-op nurse and Anesthesiologist will review your health history, obtain vital signs, and answer questions you may have.  The only belongings needed at this time will be your home medications and if applicable your C-PAP/BI-PAP machine.  If you are staying overnight your family can leave the rest of your belongings in the car and bring them to your room later.  A Pre-op nurse will start an IV and you may receive medication in preparation for surgery, including something to help you relax.  Your family will be able to see you in the Pre-op area.  While you are in surgery your family should notify the waiting room  if they leave the waiting room area and provide a contact phone number.    Please be aware that surgery does come with discomfort.  We want to make every effort to control your discomfort so please discuss any uncontrolled symptoms with your nurse.   Your doctor will most likely have prescribed pain medications.      If you are going home after surgery you will receive individualized written care instructions before being discharged.  A responsible adult must drive you to and from the hospital on the day of your surgery and stay with you for 24 hours.    If you are staying overnight following surgery, you will be transported to your hospital room following the recovery period.  [facility] has all private rooms.    If you have any questions please call Pre-Admission Testing at [phone].  Deductibles and co-payments are collected on the day of service. Please be prepared to pay the required co-pay, deductible or deposit on the day of service as defined by your plan.

## 2018-02-14 PROBLEM — N21.0 BLADDER STONES: Status: ACTIVE | Noted: 2018-01-01

## 2018-02-14 NOTE — OP NOTE
[facility]  Cornelius Polanco MD      CYSTOSCOPY BLADDER STONE LITHOTRIPSY, CYSTOSCOPY TRANSURETHRAL RESECTION OF PROSTATE  Procedure Note    Amaury Moulton  2/14/2018    Pre-op Diagnosis:   Multiple bladder stones, BPH with obstruction    Post-Op Diagnosis Codes:   Same    Procedure(s):  CYSTO LASER DESTRUCTION BLADDER STONES  CYSTOSCOPY TRANSURETHRAL RESECTION OF PROSTATE    Surgeon(s):  Cornelius Polanco MD    Anesthesia: General    Surgical Assistant: Staff    Staff:   Circulator: Cintia Cuevas RN  Scrub Person: Gerda Hyde  Orderly: Endy Pineda    Findings: Large prostate.  Multiple bladder stones.  Heavy trabeculation.    Description of Procedure: After adequate induction with general anesthesia the patient was placed in the modified supine lithotomy position on the operating table and prepped and draped in a sterile fashion over the genitalia.  Cystoscopy was performed to 23 Chadian sheath and 30° lens.  Multiple stones were identified within the bladder.  The prostate was markedly enlarged with trilobar hypertrophy.    Using the thousand micron fiber laser destruction of the stones was performed down into smaller pieces.  Some of them are irrigated free.    I then placed a 28 Chadian sheath and removed the obturator.  Using the Verde resectoscope the prostate was resected.  First it was resected around the median lobe.  I then resected the lateral lobes anterior and posterior tissue at the area of the verumontanum.    Care was taken to identify the area of the external sphincter.    At the end of the procedure I irrigated more stones out of the bladder and actually picked some out with cold cup forceps.  Hemostasis was obtained with the rollerball.    A three-way hematuria catheter was placed.  The patient was sent to the recovery room in satisfactory condition.    Estimated Blood Loss: 200ml    Specimens:    Order Name Source Comment Collection Info Order Time   STONE  ANALYSIS Urinary Bladder  Collected By: Cornelius Polanco MD 2/14/2018  6:02 PM   TISSUE PATHOLOGY EXAM Prostate  Collected By: Cornelius Polanco MD 2/14/2018  6:14 PM         Drains:   Urethral Catheter 02/14/18 latex 24 30 (Active)   Daily Indications Urologist on the case and cannot remove without order 2/14/2018  6:07 PM   Securement secured to upper leg with adhesive device 2/14/2018  6:07 PM   Irrigation/Instillation Type Catheter continuous irrigation with;other (see comments) 2/14/2018  6:07 PM   Irrigation/Instillation Indication patency maintenance 2/14/2018  6:07 PM   Irrigation Return light;pink 2/14/2018  6:07 PM   Tolerance no signs/symptoms of discomfort 2/14/2018  6:07 PM             Complications: None      Cornelius Polanco MD     Date: 2/14/2018  Time: 6:33 PM

## 2018-02-14 NOTE — ANESTHESIA PREPROCEDURE EVALUATION
Anesthesia Evaluation     Patient summary reviewed and Nursing notes reviewed                Airway   Mallampati: III  Neck ROM: limited  Dental      Pulmonary - negative pulmonary ROS   Cardiovascular     ECG reviewed  Rhythm: regular  Rate: normal    (+) pacemaker pacemaker, CHF,       Neuro/Psych  (+) weakness,     GI/Hepatic/Renal/Endo - negative ROS     Musculoskeletal (-) negative ROS    Abdominal    Substance History - negative use     OB/GYN negative ob/gyn ROS         Other                        Anesthesia Plan    ASA 3     general   (Pacemaker)  intravenous induction   Anesthetic plan and risks discussed with patient.

## 2018-02-14 NOTE — ANESTHESIA PROCEDURE NOTES
Airway  Urgency: elective    Airway not difficult    General Information and Staff    Patient location during procedure: OR  Anesthesiologist: TRAMAINE SUMMERS  CRNA: JENS GREY    Indications and Patient Condition  Indications for airway management: airway protection    Preoxygenated: yes  Mask difficulty assessment: 0 - not attempted    Final Airway Details  Final airway type: supraglottic airway      Successful airway: classic  Size 5    Number of attempts at approach: 1    Additional Comments  LMA inserted without difficulty.  Lips and teeth intact as preop condition.  No signs or symptoms of gastric regurgitation.  Seal with minimal pressure and secure.

## 2018-02-14 NOTE — H&P
[facility]  Cornelius Moya MD    Patient Care Team:  Ankush Sanon MD as PCP - General  Terrance Allison MD as Consulting Physician (Cardiology)    Chief complaint slow stream, multiple nocturia, frequency, bladder stones.    Subjective .     History of present illness:  This 83-year-old male has difficulty voiding with slow stream and multiple nocturia.  He underwent cystoscopy which showed an enlarged prostate, trabeculation, left bladder wall diverticula with multiple stones.    Review of Systems  All systems were reviewed and were negative except for no chest pain.  No shortness of air.  No abdominal pain.  The cleansed with slow stream.    History  Past Medical History:   Diagnosis Date   • Arrhythmia    • Atherosclerotic vascular disease    • Benign prostatic hypertrophy    • Bladder calculus     SEES DR. MOYA - UROL   • Bradycardia    • Cardiomegaly    • History of hepatitis     AGE 21   • History of small bowel obstruction    • Hyperlipidemia    • Limb swelling    • Peripheral edema    • Weakness    , Past Surgical History:   Procedure Laterality Date   • CARDIAC PACEMAKER PLACEMENT      Octoberâ€“2014â€“Dr. Allison and associates.   • CATARACT EXTRACTION     • COLONOSCOPY      January 2015â€“normalâ€“Dr. Ahmet Harvey (surgery)   • HERNIA REPAIR      October 2014â€“Dr. Ahmet Johnson€“patient had surgery for a left inguinal hernia however at the time of surgery no hernia was found either internally or externally according to the surgical report.   • TOTAL SHOULDER ARTHROPLASTY W/ DISTAL CLAVICLE EXCISION Right 5/15/2017    Procedure: Open Reduction and Internal Fixation Proximal Humerus;  Surgeon: Hugo Way MD;  [facility];  Service:    , Family History   Problem Relation Age of Onset   • Malig Hyperthermia Neg Hx    , Social History   Substance Use Topics   • Smoking status: Never Smoker   • Smokeless tobacco: Never Used   • Alcohol use No   , Prescriptions Prior to  Admission   Medication Sig Dispense Refill Last Dose   • alfuzosin (UROXATRAL) 10 MG 24 hr tablet Take 1 tablet by mouth Daily.  3 2/13/2018 at 2000   • atorvastatin (LIPITOR) 40 MG tablet Take 40 mg by mouth Every Other Day.   2/13/2018 at 2000   • docusate sodium (COLACE) 100 MG capsule Take 100 mg by mouth 2 (Two) Times a Day As Needed for Constipation.   Past Month at Unknown time   • finasteride (PROSCAR) 5 MG tablet Take 5 mg by mouth Daily.   2/13/2018 at 2000   • midodrine (PROAMATINE) 2.5 MG tablet Take 1 tablet by mouth 3 (Three) Times a Day. 180 tablet 3 2/14/2018 at 0800   • ibuprofen (ADVIL,MOTRIN) 200 MG tablet Take 200 mg by mouth Every 6 (Six) Hours As Needed for Mild Pain (1-3). HOLD PRIOR TO SURG   Unknown at Unknown time   • Multiple Vitamins-Minerals (MULTI COMPLETE PO) Take 1 tablet by mouth Daily. TO HOLD 1 WEEK BEFORE SURGERY   1/31/2018   • saw palmetto 160 MG capsule Take 160 mg by mouth Daily. PT HOLDING FOR SURGERY   More than a month at Unknown time   , Scheduled Meds:    ceftriaxone 1 g Intravenous Once   , Continuous Infusions:    lactated ringers 9 mL/hr Last Rate: 9 mL/hr (02/14/18 1338)   , PRN Meds:  fentanyl  •  lidocaine PF 1%  •  midazolam **OR** midazolam  •  sodium chloride, Allergies:  No known drug allergy and     Objective     Vital Signs   Temp:  [97.7 °F (36.5 °C)] 97.7 °F (36.5 °C)  Heart Rate:  [62] 62  Resp:  [18] 18  BP: (166)/(99) 166/99    Physical Exam:     General Appearance:    Alert, cooperative, in no acute distress   Head:    Normocephalic, without obvious abnormality, atraumatic   Eyes:            Lids and lashes normal, conjunctivae and sclerae normal, no   icterus, no pallor, corneas clear, PERRLA   Ears:    Ears appear intact with no abnormalities noted   Throat:   No oral lesions, no thrush, oral mucosa moist   Neck:   No adenopathy, supple, trachea midline,    Back:     No kyphosis present, no scoliosis present, no skin lesions,      erythema or scars, no  tenderness to percussion or                   palpation,   range of motion normal   Lungs:     Clear to auscultation,respirations regular, even and                  unlabored    Heart:    Regular rhythm and normal rate, normal S1 and S2, no            murmur, no gallop, no rub, no click   Chest Wall:    No abnormalities observed   Abdomen:     Normal bowel sounds, no masses, no organomegaly, soft        non-tender, non-distended, no guarding, no rebound                tenderness   Genital/Rectal:     Deferred    Extremities:   Moves all extremities well, no edema, no cyanosis, no             redness       Skin:   No bleeding, bruising or rash       Neurologic:   Cranial nerves 2 - 12 grossly intact, sensation intact,       Results Review:   I reviewed the patient's new clinical results.      Assessment/Plan     Active Problems:    * No active hospital problems. *      BPH and bladder outlet obstruction, slow stream, multiple bladder stones.    I discussed the patients findings and my recommendations with patient, family, nursing staff and We intend to perform cystoscopy with TURP, laser destruction multiple bladder stones.    Cornelius Polanco MD  02/14/18  3:32 PM    Time: Preoperative evaluation.

## 2018-02-14 NOTE — PLAN OF CARE
Problem: Patient Care Overview (Adult)  Goal: Plan of Care Review  Outcome: Ongoing (interventions implemented as appropriate)   02/14/18 1345   Coping/Psychosocial Response Interventions   Plan Of Care Reviewed With patient   Patient Care Overview   Progress no change     Goal: Adult Individualization and Mutuality  Outcome: Ongoing (interventions implemented as appropriate)   02/14/18 1345   Individualization   Patient Specific Preferences Amaury     Goal: Discharge Needs Assessment  Outcome: Ongoing (interventions implemented as appropriate)      Problem: Perioperative Period (Adult)  Goal: Signs and Symptoms of Listed Potential Problems Will be Absent or Manageable (Perioperative Period)  Outcome: Ongoing (interventions implemented as appropriate)   02/14/18 1345   Perioperative Period   Problems Assessed (Perioperative Period) all   Problems Present (Perioperative Period) none

## 2018-02-15 NOTE — ANESTHESIA POSTPROCEDURE EVALUATION
Patient: Amaury Moulton    Procedure Summary     Date Anesthesia Start Anesthesia Stop Room / Location    02/14/18 1849 [id number]  [facility] / [facility]       Procedure Diagnosis Surgeon Provider    CYSTO LASER DESTRUCTION BLADDER STONES (N/A ); CYSTOSCOPY TRANSURETHRAL RESECTION OF PROSTATE (N/A ) No diagnosis on file. MD Arsalan De Jesus MD          Anesthesia Type: general  Last vitals  BP   168/86 (02/14/18 1930)   Temp   36.6 °C (97.8 °F) (02/14/18 1930)   Pulse   60 (02/14/18 1930)   Resp   14 (02/14/18 1930)     SpO2   100 % (02/14/18 1930)     Post Anesthesia Care and Evaluation    Patient location during evaluation: bedside  Patient participation: complete - patient participated  Level of consciousness: awake  Pain score: 1  Pain management: adequate  Airway patency: patent  Anesthetic complications: No anesthetic complications    Cardiovascular status: acceptable  Respiratory status: acceptable  Hydration status: acceptable    Comments: ---------------------------               02/14/18 1930         ---------------------------   BP:          168/86         Pulse:         60           Resp:          14           Temp:   36.6 °C (97.8 °F)   SpO2:         100%         ---------------------------

## 2018-02-15 NOTE — PLAN OF CARE
Problem: Patient Care Overview (Adult)  Goal: Plan of Care Review  Outcome: Ongoing (interventions implemented as appropriate)   02/15/18 0456 02/15/18 1123 02/15/18 1701   Coping/Psychosocial Response Interventions   Plan Of Care Reviewed With --  patient;daughter --    Patient Care Overview   Progress no change --  --    Outcome Evaluation   Outcome Summary/Follow up Plan --  --  No c/o of pain or nausea. CBI cont. Patient ambulated in room x2 and sat in chair for lunch and dinner. Will cont. to monitor and follow current orders.     Goal: Adult Individualization and Mutuality  Outcome: Ongoing (interventions implemented as appropriate)    Goal: Discharge Needs Assessment  Outcome: Ongoing (interventions implemented as appropriate)      Problem: Perioperative Period (Adult)  Goal: Signs and Symptoms of Listed Potential Problems Will be Absent or Manageable (Perioperative Period)  Outcome: Ongoing (interventions implemented as appropriate)      Problem: Pain, Acute (Adult)  Goal: Acceptable Pain Control/Comfort Level  Outcome: Ongoing (interventions implemented as appropriate)

## 2018-02-15 NOTE — PROGRESS NOTES
Discharge Planning Assessment  [facility]     Patient Name: Amaury Moulton  MRN: [id number]  Today's Date: 2/15/2018    Admit Date: 2/14/2018          Discharge Needs Assessment       02/15/18 1337    Living Environment    Lives With spouse    Living Arrangements house    Home Accessibility no concerns    Stair Railings at Home inside, present at both sides;outside, present on right side    Type of Financial/Environmental Concern none    Transportation Available car;family or friend will provide    Living Environment    Provides Primary Care For no one    Quality Of Family Relationships supportive;helpful;involved    Able to Return to Prior Living Arrangements yes    Living Arrangement Comments Home with spouse    Discharge Needs Assessment    Concerns To Be Addressed no discharge needs identified    Readmission Within The Last 30 Days no previous admission in last 30 days    Anticipated Changes Related to Illness none    Equipment Currently Used at Home none    Equipment Needed After Discharge none    Discharge Disposition --   home with spouse            Discharge Plan       02/15/18 1331    Case Management/Social Work Plan    Plan Home no needs by auto    Patient/Family In Agreement With Plan yes    Additional Comments Reviewed the face sheet with the patient and he states the only change is the mobile number we have down for the patient is his daughter's/Lizabeth. Made the corrections. The discharge plan is home and the family will provide the transportation to home. Verified pharmacy [facility]. WANDA Higgins RN, CCP.         Discharge Placement     No information found                Demographic Summary       02/15/18 1332    Referral Information    Admission Type observation    Arrived From home or self-care    Referral Source admission list    Record Reviewed clinical discipline documentation;history and physical;medical record;patient profile;plan of care    Contact Information    Permission Granted to Share  Information With     Primary Care Physician Information    Name Dr. Ankush Sanon            Functional Status       02/15/18 1338    Functional Status Current    Ambulation 0-->independent    Transferring 0-->independent    Toileting 0-->independent    Bathing 0-->independent    Dressing 0-->independent    Eating 0-->independent    Communication 0-->understands/communicates without difficulty    Swallowing (if score 2 or more for any item, consult Rehab Services) 0-->swallows foods/liquids without difficulty    Change in Functional Status Since Onset of Current Illness/Injury no    Functional Status Prior    Ambulation 0-->independent    Transferring 0-->independent    Toileting 0-->independent    Bathing 0-->independent    Dressing 0-->independent    Eating 0-->independent    Communication 0-->understands/communicates without difficulty    Swallowing 0-->swallows foods/liquids without difficulty    IADL    Medications independent    Meal Preparation independent    Housekeeping independent    Laundry independent    Shopping independent    Oral Care independent    Activity Tolerance    Current Activity Limitations none    Usual Activity Tolerance good    Current Activity Tolerance good    Cognitive/Perceptual/Developmental    Current Mental Status/Cognitive Functioning no deficits noted    Recent Changes in Mental Status/Cognitive Functioning no changes    Developmental Stage (Eriksson's Stages of Development) Stage 8 (65 years-death/Late Adulthood) Integrity vs. Despair    Employment/Financial    Source Of Income social security    Financial Concerns none    Application For Public Assistance not applied            Psychosocial     None            Abuse/Neglect     None            Legal       02/15/18 1330    Legal    Legal Comments Has a living will and Dr. Sanon's office has a copy of it. Notified patient that could he have his spouse bring in a copy. WANDA Higgins RN, CCP            Substance Abuse      None            Patient Forms     None          Maylin Higgins RN

## 2018-02-15 NOTE — PLAN OF CARE
Problem: Patient Care Overview (Adult)  Goal: Plan of Care Review  Outcome: Ongoing (interventions implemented as appropriate)   02/15/18 0456   Coping/Psychosocial Response Interventions   Plan Of Care Reviewed With patient   Patient Care Overview   Progress no change   Outcome Evaluation   Outcome Summary/Follow up Plan Admitted from OR, CBI with sterile water in place. Denies pain.Oriented pt to room, equipment, meds and plan of care. VSS. Will monitor CBI, vs, i/o, pain, labs and safety       Problem: Fall Risk (Adult)  Goal: Identify Related Risk Factors and Signs and Symptoms  Outcome: Outcome(s) achieved Date Met: 02/15/18   02/15/18 0456   Fall Risk   Fall Risk: Related Risk Factors bladder function altered;culprit medication(s);environment unfamiliar;history of falls   Fall Risk: Signs and Symptoms presence of risk factors     Goal: Absence of Falls  Outcome: Outcome(s) achieved Date Met: 02/15/18      Problem: Pain, Acute (Adult)  Goal: Identify Related Risk Factors and Signs and Symptoms  Outcome: Outcome(s) achieved Date Met: 02/15/18   02/15/18 0456   Pain, Acute   Related Risk Factors (Acute Pain) persistent pain;procedure/treatment;surgery   Signs and Symptoms (Acute Pain) questions meaning of pain;verbalization of pain descriptors     Goal: Acceptable Pain Control/Comfort Level  Outcome: Ongoing (interventions implemented as appropriate)

## 2018-02-15 NOTE — PROGRESS NOTES
[facility]  Cornelius Polanco MD     LOS: 1 day              POD #1 status post TURP and laser ablation stones  Patient Care Team:  Ankush Sanon MD as PCP - General  Terrance Allison MD as Consulting Physician (Cardiology)        Subjective     Interval History:     Patient Complaints: none  History taken from: patient chart family    Review of Systems:   All systems were reviewed and were negative except for     Objective     Vital Signs  Temp:  [96.9 °F (36.1 °C)-97.8 °F (36.6 °C)] 96.9 °F (36.1 °C)  Heart Rate:  [59-68] 60  Resp:  [14-18] 16  BP: (116-169)/(70-99) 126/70    Physical Exam:     General Appearance:    Alert, cooperative, in no acute distress   Head:    Normocephalic, without obvious abnormality, atraumatic   Eyes:            Lids and lashes normal, conjunctivae and sclerae normal, no   icterus, no pallor, corneas clear, PERRLA   Ears:    Ears appear intact with no abnormalities noted   Throat:   No oral lesions, no thrush, oral mucosa moist   Neck:   No adenopathy, supple, trachea midline,    Back:     No kyphosis present, no scoliosis present, no skin lesions,      erythema or scars, no tenderness to percussion or                   palpation,   range of motion normal   Lungs:     Clear to auscultation,respirations regular, even and                  unlabored    Heart:    Regular rhythm and normal rate, normal S1 and S2, no            murmur, no gallop, no rub, no click   Chest Wall:    No abnormalities observed   Abdomen:     Normal bowel sounds, no masses, no organomegaly, soft        non-tender, non-distended, no guarding, no rebound                tenderness   Genital/Rectal:     Catheter in place.  Needs drip to stay clear.     Extremities:   Moves all extremities well, no edema, no cyanosis, no             redness       Skin:   No bleeding, bruising or rash       Neurologic:   Cranial nerves 2 - 12 grossly intact, sensation intact,        Results Review:     I reviewed the patient's  new clinical results.    Recent Results (from the past 24 hour(s))   Basic Metabolic Panel    Collection Time: 02/14/18  9:39 PM   Result Value Ref Range    Glucose 110 (H) 65 - 99 mg/dL    BUN 25 (H) 8 - 23 mg/dL    Creatinine 1.12 0.76 - 1.27 mg/dL    Sodium 141 136 - 145 mmol/L    Potassium 4.2 3.5 - 5.2 mmol/L    Chloride 106 98 - 107 mmol/L    CO2 25.3 22.0 - 29.0 mmol/L    Calcium 8.3 (L) 8.6 - 10.5 mg/dL    eGFR Non African Amer 63 >60 mL/min/1.73    BUN/Creatinine Ratio 22.3 7.0 - 25.0    Anion Gap 9.7 mmol/L   CBC Auto Differential    Collection Time: 02/14/18  9:39 PM   Result Value Ref Range    WBC 8.04 4.50 - 10.70 10*3/mm3    RBC 3.88 (L) 4.60 - 6.00 10*6/mm3    Hemoglobin 11.7 (L) 13.7 - 17.6 g/dL    Hematocrit 37.6 (L) 40.4 - 52.2 %    MCV 96.9 (H) 79.8 - 96.2 fL    MCH 30.2 27.0 - 32.7 pg    MCHC 31.1 (L) 32.6 - 36.4 g/dL    RDW 14.2 11.5 - 14.5 %    RDW-SD 50.5 37.0 - 54.0 fl    MPV 9.8 6.0 - 12.0 fL    Platelets 189 140 - 500 10*3/mm3    Neutrophil % 88.1 (H) 42.7 - 76.0 %    Lymphocyte % 10.9 (L) 19.6 - 45.3 %    Monocyte % 0.5 (L) 5.0 - 12.0 %    Eosinophil % 0.4 0.3 - 6.2 %    Basophil % 0.1 0.0 - 1.5 %    Immature Grans % 0.0 0.0 - 0.5 %    Neutrophils, Absolute 7.08 1.90 - 8.10 10*3/mm3    Lymphocytes, Absolute 0.88 (L) 0.90 - 4.80 10*3/mm3    Monocytes, Absolute 0.04 (L) 0.20 - 1.20 10*3/mm3    Eosinophils, Absolute 0.03 0.00 - 0.70 10*3/mm3    Basophils, Absolute 0.01 0.00 - 0.20 10*3/mm3    Immature Grans, Absolute 0.00 0.00 - 0.03 10*3/mm3   Basic Metabolic Panel    Collection Time: 02/15/18  6:05 AM   Result Value Ref Range    Glucose 104 (H) 65 - 99 mg/dL    BUN 28 (H) 8 - 23 mg/dL    Creatinine 1.07 0.76 - 1.27 mg/dL    Sodium 139 136 - 145 mmol/L    Potassium 5.8 (H) 3.5 - 5.2 mmol/L    Chloride 107 98 - 107 mmol/L    CO2 21.4 (L) 22.0 - 29.0 mmol/L    Calcium 8.2 (L) 8.6 - 10.5 mg/dL    eGFR Non African Amer 66 >60 mL/min/1.73    BUN/Creatinine Ratio 26.2 (H) 7.0 - 25.0    Anion Gap  10.6 mmol/L   CBC Auto Differential    Collection Time: 02/15/18  8:08 AM   Result Value Ref Range    WBC 11.51 (H) 4.50 - 10.70 10*3/mm3    RBC 4.00 (L) 4.60 - 6.00 10*6/mm3    Hemoglobin 12.0 (L) 13.7 - 17.6 g/dL    Hematocrit 38.6 (L) 40.4 - 52.2 %    MCV 96.5 (H) 79.8 - 96.2 fL    MCH 30.0 27.0 - 32.7 pg    MCHC 31.1 (L) 32.6 - 36.4 g/dL    RDW 14.0 11.5 - 14.5 %    RDW-SD 49.6 37.0 - 54.0 fl    MPV 9.9 6.0 - 12.0 fL    Platelets 191 140 - 500 10*3/mm3    Neutrophil % 83.3 (H) 42.7 - 76.0 %    Lymphocyte % 13.3 (L) 19.6 - 45.3 %    Monocyte % 3.1 (L) 5.0 - 12.0 %    Eosinophil % 0.0 (L) 0.3 - 6.2 %    Basophil % 0.1 0.0 - 1.5 %    Immature Grans % 0.2 0.0 - 0.5 %    Neutrophils, Absolute 9.59 (H) 1.90 - 8.10 10*3/mm3    Lymphocytes, Absolute 1.53 0.90 - 4.80 10*3/mm3    Monocytes, Absolute 0.36 0.20 - 1.20 10*3/mm3    Eosinophils, Absolute 0.00 0.00 - 0.70 10*3/mm3    Basophils, Absolute 0.01 0.00 - 0.20 10*3/mm3    Immature Grans, Absolute 0.02 0.00 - 0.03 10*3/mm3       Medication Review:   Current Facility-Administered Medications:   •  atorvastatin (LIPITOR) tablet 40 mg, 40 mg, Oral, Every Other Day, Cornelius Polanco MD, 40 mg at 02/14/18 2331  •  docusate sodium (COLACE) capsule 100 mg, 100 mg, Oral, BID PRN, Cornelius Polanco MD  •  finasteride (PROSCAR) tablet 5 mg, 5 mg, Oral, Daily, Cornelius Polanco MD, 5 mg at 02/14/18 2334  •  HYDROcodone-acetaminophen (NORCO) 5-325 MG per tablet 1 tablet, 1 tablet, Oral, Q4H PRN, Cornelius Polanco MD  •  midodrine (PROAMATINE) tablet 2.5 mg, 2.5 mg, Oral, TID, Cornelius Polanco MD, 2.5 mg at 02/14/18 2335  •  sodium chloride 0.9 % flush 1-10 mL, 1-10 mL, Intravenous, PRN, Cornelius Polanco MD  •  sodium chloride 0.9 % infusion, 9 mL/hr, Intravenous, Continuous, Cornelius Polanco MD, Last Rate: 9 mL/hr at 02/14/18 1541, 9 mL/hr at 02/14/18 1541  •  sodium chloride 0.9 % infusion, 75 mL/hr, Intravenous, Continuous, Cornelius Polanco MD, Last Rate: 75  mL/hr at 02/15/18 0133, 75 mL/hr at 02/15/18 0133    Assessment/Plan     Active Problems:    Bladder stones      BPH with obstruction  Status post TURP    Plan for disposition:Continue drip.  Ambulate.    Cornelius Polanco MD  02/15/18  9:40 AM      Time: 25+

## 2018-02-15 NOTE — PROGRESS NOTES
Discharge Planning Assessment  [facility]     Patient Name: Amaury Moulton  MRN: [id number]  Today's Date: 2/15/2018    Admit Date: 2/14/2018          Discharge Needs Assessment       02/15/18 1337    Living Environment    Lives With spouse    Living Arrangements house    Home Accessibility no concerns    Stair Railings at Home inside, present at both sides;outside, present on right side    Type of Financial/Environmental Concern none    Transportation Available car;family or friend will provide    Living Environment    Provides Primary Care For no one    Quality Of Family Relationships supportive;helpful;involved    Able to Return to Prior Living Arrangements yes    Living Arrangement Comments Home with spouse    Discharge Needs Assessment    Concerns To Be Addressed no discharge needs identified    Readmission Within The Last 30 Days no previous admission in last 30 days    Anticipated Changes Related to Illness none    Equipment Currently Used at Home none    Equipment Needed After Discharge none    Discharge Disposition --   home with spouse            Discharge Plan       02/15/18 1331    Case Management/Social Work Plan    Plan Home no needs by auto    Patient/Family In Agreement With Plan yes    Additional Comments Reviewed the face sheet with the patient and he states the only change is the mobile number we have down for the patient is his daughter's/Lizabeth. Made the corrections. The discharge plan is home and the family will provide the transportation to home. Verified pharmacy [facility]. WANDA Higgins RN, CCP.         Discharge Placement     No information found                Demographic Summary       02/15/18 1332    Referral Information    Admission Type observation    Arrived From home or self-care    Referral Source admission list    Record Reviewed clinical discipline documentation;history and physical;medical record;patient profile;plan of care    Contact Information    Permission Granted to Share  Information With     Primary Care Physician Information    Name Dr. Ankush Sanon            Functional Status       02/15/18 1338    Functional Status Current    Ambulation 0-->independent    Transferring 0-->independent    Toileting 0-->independent    Bathing 0-->independent    Dressing 0-->independent    Eating 0-->independent    Communication 0-->understands/communicates without difficulty    Swallowing (if score 2 or more for any item, consult Rehab Services) 0-->swallows foods/liquids without difficulty    Change in Functional Status Since Onset of Current Illness/Injury no    Functional Status Prior    Ambulation 0-->independent    Transferring 0-->independent    Toileting 0-->independent    Bathing 0-->independent    Dressing 0-->independent    Eating 0-->independent    Communication 0-->understands/communicates without difficulty    Swallowing 0-->swallows foods/liquids without difficulty    IADL    Medications independent    Meal Preparation independent    Housekeeping independent    Laundry independent    Shopping independent    Oral Care independent    Activity Tolerance    Current Activity Limitations none    Usual Activity Tolerance good    Current Activity Tolerance good    Cognitive/Perceptual/Developmental    Current Mental Status/Cognitive Functioning no deficits noted    Recent Changes in Mental Status/Cognitive Functioning no changes    Developmental Stage (Eriksson's Stages of Development) Stage 8 (65 years-death/Late Adulthood) Integrity vs. Despair    Employment/Financial    Source Of Income social security    Financial Concerns none    Application For Public Assistance not applied            Psychosocial     None            Abuse/Neglect     None            Legal       02/15/18 1330    Legal    Legal Comments Has a living will and Dr. Sanon's office has a copy of it. Notified patient that could he have his spouse bring in a copy. WANDA Higgins RN, CCP            Substance Abuse      None            Patient Forms       02/15/18 1341    Patient Forms    Patient Observation Letter Delivered    Delivered to Patient   left copy in room and wrote on the white board observation status          Maylin Higgins RN

## 2018-02-16 NOTE — PLAN OF CARE
Problem: Patient Care Overview (Adult)  Goal: Plan of Care Review  Outcome: Ongoing (interventions implemented as appropriate)   02/16/18 9887   Coping/Psychosocial Response Interventions   Plan Of Care Reviewed With patient;daughter   Patient Care Overview   Progress improving   Outcome Evaluation   Outcome Summary/Follow up Plan No c/o pain. Patient was ambulated in hallway and sat up in chair for two meals today. Patient was very cooperative and pleasant. Plan is to dc núñez in the AM and hopefully dc home tomorrow afternoon.      Goal: Adult Individualization and Mutuality  Outcome: Ongoing (interventions implemented as appropriate)    Goal: Discharge Needs Assessment  Outcome: Ongoing (interventions implemented as appropriate)      Problem: Perioperative Period (Adult)  Goal: Signs and Symptoms of Listed Potential Problems Will be Absent or Manageable (Perioperative Period)  Outcome: Outcome(s) achieved Date Met: 02/16/18      Problem: Fall Risk (Adult)  Goal: Absence of Falls  Outcome: Ongoing (interventions implemented as appropriate)      Problem: Pain, Acute (Adult)  Goal: Acceptable Pain Control/Comfort Level  Outcome: Ongoing (interventions implemented as appropriate)

## 2018-02-16 NOTE — PLAN OF CARE
Problem: Patient Care Overview (Adult)  Goal: Plan of Care Review  Outcome: Ongoing (interventions implemented as appropriate)      Problem: Fall Risk (Adult)  Goal: Absence of Falls  Outcome: Ongoing (interventions implemented as appropriate)      Problem: Pain, Acute (Adult)  Goal: Acceptable Pain Control/Comfort Level  Outcome: Ongoing (interventions implemented as appropriate)

## 2018-02-16 NOTE — PROGRESS NOTES
[facility]  Cornelius Polanco MD     LOS: 2 days   Patient Care Team:  Ankush Sanon MD as PCP - General  Terrance Allison MD as Consulting Physician (Cardiology)        Subjective                    POD #2 status post TURP/destruction bladder stones      Interval History:     Patient Complaints: Better.  Some bladder spasms evidently.  Significant pain with spasms.  Difficulty with ambulation.  Week.  His only been up to eat.  Unsteady on his feet  History taken from: patient chart family    Review of Systems:   All systems were reviewed and were negative except for continues to need TUR drip.  No chest pain.  No shortness of air.  Some abdominal cramping and bladder spasms.  Leg weakness.  Difficulty with ambulation.  Unsteady on his feet area     Vital Signs  Temp:  [97.3 °F (36.3 °C)-99.4 °F (37.4 °C)] 97.6 °F (36.4 °C)  Heart Rate:  [58-70] 70  Resp:  [16] 16  BP: ()/(53-68) 111/62    Physical Exam:     General Appearance:    Alert, cooperative, in no acute distress   Head:    Normocephalic, without obvious abnormality, atraumatic   Eyes:            Lids and lashes normal, conjunctivae and sclerae normal, no   icterus, no pallor, corneas clear, PERRLA   Ears:    Ears appear intact with no abnormalities noted   Throat:   No oral lesions, no thrush, oral mucosa moist   Neck:   No adenopathy, supple, trachea midline,    Back:     No kyphosis present, no scoliosis present, no skin lesions,      erythema or scars, no tenderness to percussion or                   palpation,   range of motion normal   Lungs:     Clear to auscultation,respirations regular, even and                  unlabored    Heart:    Regular rhythm and normal rate, normal S1 and S2, no            murmur, no gallop, no rub, no click   Chest Wall:    No abnormalities observed   Abdomen:     Normal bowel sounds, no masses, no organomegaly, soft        non-tender, non-distended, no guarding, no rebound                tenderness    Genitourinary:        Catheter still in place.  Urine clear with drip.  Bladder spasms obvious.         Skin:   No  bruising or rash       Neurologic:   Cranial nerves 2 - 12 grossly intact, sensation intact,        Results Review:     I reviewed the patient's new clinical results.  Discussed with The patient family.  White blood cell count decreased back to normal.    Recent Results (from the past 24 hour(s))   CBC Auto Differential    Collection Time: 02/16/18  4:27 AM   Result Value Ref Range    WBC 9.17 4.50 - 10.70 10*3/mm3    RBC 3.67 (L) 4.60 - 6.00 10*6/mm3    Hemoglobin 11.0 (L) 13.7 - 17.6 g/dL    Hematocrit 35.0 (L) 40.4 - 52.2 %    MCV 95.4 79.8 - 96.2 fL    MCH 30.0 27.0 - 32.7 pg    MCHC 31.4 (L) 32.6 - 36.4 g/dL    RDW 14.3 11.5 - 14.5 %    RDW-SD 49.9 37.0 - 54.0 fl    MPV 9.6 6.0 - 12.0 fL    Platelets 184 140 - 500 10*3/mm3    Neutrophil % 66.8 42.7 - 76.0 %    Lymphocyte % 27.4 19.6 - 45.3 %    Monocyte % 3.9 (L) 5.0 - 12.0 %    Eosinophil % 1.6 0.3 - 6.2 %    Basophil % 0.3 0.0 - 1.5 %    Immature Grans % 0.0 0.0 - 0.5 %    Neutrophils, Absolute 6.12 1.90 - 8.10 10*3/mm3    Lymphocytes, Absolute 2.51 0.90 - 4.80 10*3/mm3    Monocytes, Absolute 0.36 0.20 - 1.20 10*3/mm3    Eosinophils, Absolute 0.15 0.00 - 0.70 10*3/mm3    Basophils, Absolute 0.03 0.00 - 0.20 10*3/mm3    Immature Grans, Absolute 0.00 0.00 - 0.03 10*3/mm3       Medication Review:   Current Facility-Administered Medications:   •  atorvastatin (LIPITOR) tablet 40 mg, 40 mg, Oral, Every Other Day, Cornelius Polanco MD, 40 mg at 02/16/18 0820  •  docusate sodium (COLACE) capsule 100 mg, 100 mg, Oral, BID PRN, Cornelius Polanco MD  •  finasteride (PROSCAR) tablet 5 mg, 5 mg, Oral, Daily, Cornelius Polanco MD, 5 mg at 02/16/18 0820  •  HYDROcodone-acetaminophen (NORCO) 5-325 MG per tablet 1 tablet, 1 tablet, Oral, Q4H PRN, Cornelius Polanco MD  •  midodrine (PROAMATINE) tablet 2.5 mg, 2.5 mg, Oral, TID, Cornelius Polanco MD,  2.5 mg at 02/16/18 0820  •  sodium chloride 0.9 % flush 1-10 mL, 1-10 mL, Intravenous, PRN, Cornelius Polanco MD  •  sodium chloride 0.9 % infusion, 9 mL/hr, Intravenous, Continuous, Cornelius Polanco MD, Last Rate: 9 mL/hr at 02/14/18 1541, 9 mL/hr at 02/14/18 1541  •  sodium chloride 0.9 % infusion, 75 mL/hr, Intravenous, Continuous, Cornelius Polanco MD, Last Rate: 75 mL/hr at 02/16/18 0445, 75 mL/hr at 02/16/18 0445    Assessment/Plan     Active Problems:    Bladder stones      Bladder spasms  BPH with bladder outlet obstruction-status post TURP  Plan for disposition:Continue bladder drip for now.  Ambulate.  Voiding trial soon.    Cornelius Polanco MD  02/16/18  9:00 AM      Time: Greater than 25

## 2018-02-17 NOTE — PROGRESS NOTES
POD 3 from TURP and cysto laser of bladder stones    AFVSS  CR 1.05 Hgb 11.0 Hct 36.8 Ca 8.1  Denies any pain  Voiding on own.   Urine pink tinged  No nausea   Tolerating food  Pt ambulated x 2 yesterday but weak    Plan:  Ambulate today and assess strength and ability to go home   Potential discharge tomorrow

## 2018-02-17 NOTE — PLAN OF CARE
Problem: Patient Care Overview (Adult)  Goal: Plan of Care Review  Outcome: Ongoing (interventions implemented as appropriate)   02/17/18 1612   Coping/Psychosocial Response Interventions   Plan Of Care Reviewed With patient;daughter   Patient Care Overview   Progress improving   Outcome Evaluation   Outcome Summary/Follow up Plan Pt.'s CBI was discontinued this morning. He's voiding frequently, but it is getting better per the pt. Bladder spasms are doing better as well. He got up and walked in the hallway. PT to see him today. Will continue to monitor.     Goal: Adult Individualization and Mutuality  Outcome: Ongoing (interventions implemented as appropriate)    Goal: Discharge Needs Assessment  Outcome: Ongoing (interventions implemented as appropriate)      Problem: Fall Risk (Adult)  Goal: Absence of Falls  Outcome: Ongoing (interventions implemented as appropriate)      Problem: Pain, Acute (Adult)  Goal: Acceptable Pain Control/Comfort Level  Outcome: Ongoing (interventions implemented as appropriate)

## 2018-02-18 NOTE — PLAN OF CARE
Problem: Patient Care Overview (Adult)  Goal: Plan of Care Review  Outcome: Ongoing (interventions implemented as appropriate)   02/17/18 1612 02/17/18 2015 02/18/18 0549   Coping/Psychosocial Response Interventions   Plan Of Care Reviewed With --  patient;daughter --    Patient Care Overview   Progress improving --  --    Outcome Evaluation   Outcome Summary/Follow up Plan --  --  Patient is voiding frequently. No complaints of bladder spasms. Patient slept all night. No family at bedside. Patient is encouraged to ambulate. To work with PT. Will continue to monitor vital signs, labs, urination and comfort.     Goal: Adult Individualization and Mutuality  Outcome: Ongoing (interventions implemented as appropriate)    Goal: Discharge Needs Assessment  Outcome: Ongoing (interventions implemented as appropriate)      Problem: Fall Risk (Adult)  Goal: Absence of Falls  Outcome: Ongoing (interventions implemented as appropriate)      Problem: Pain, Acute (Adult)  Goal: Acceptable Pain Control/Comfort Level  Outcome: Ongoing (interventions implemented as appropriate)

## 2018-02-18 NOTE — PROGRESS NOTES
Discharge Planning Assessment  [facility]     Patient Name: Amaury Moulton  MRN: [id number]  Today's Date: 2/18/2018    Admit Date: 2/14/2018          Discharge Needs Assessment     None            Discharge Plan       02/18/18 1134    Final Note    Final Note Call placed to Daughter Lizabeth [phone] she wanted information on Medicare  OBSERVATION status CCP discussed her father's diagnosis is an OBSERVATION only procedure and had no complications. Daughter voiced understanding. Sheldon RN        Discharge Placement     No information found        Expected Discharge Date and Time     Expected Discharge Date Expected Discharge Time    Feb 18, 2018 11:26 AM              Demographic Summary     None            Functional Status     None            Psychosocial     None            Abuse/Neglect     None            Legal     None            Substance Abuse     None            Patient Forms     None          Graciela Veloz, RN

## 2018-02-18 NOTE — PROGRESS NOTES
POD 4 from TURP and cysto laser of bladder stones     AFVSS  CR 0.9 Hgb 12.0   Denies any pain  Voiding on own.   Urine pink tinged  No nausea   Tolerating food  Pt ambulated x 2 yesterday but weak     Plan:  Home today  Med rec entered  Follow up with dr turpin in 2 weeks

## 2018-02-19 NOTE — PROGRESS NOTES
Case Management Discharge Note    Final Note: The patient was discharged to home on 2/18/18. WANDA Higgins RN, CCP.     Discharge Placement     No information found             Discharge Codes: 01  Discharge to home

## 2018-03-26 NOTE — TELEPHONE ENCOUNTER
Pt's daughter, Lizabeth Bundy called to say pt wants to go ahead with the referral to a hand specialist.  Call Lizabeth @ # [phone]  Thanks SP

## 2018-03-26 NOTE — PROGRESS NOTES
"  Amaury Moulton is a 83 y.o. male who presents with   Chief Complaint   Patient presents with   • Hyperlipidemia   • Fatigue   • Unsteadiness   .    83-year-old male presents for his six-month checkup/lab update visit.  He came with his daughter who had a multitude of issues to discuss.  Apparently on the patient's last visit when he was seen for dizziness and was referred to ENT for that and earwax he subsequently saw cardiology who did not find any problems from the cardiac standpoint but noted orthostatic hypotension ( presumably from Flomax that he had been taking for his prostate according to the daughter) and placed him on midodrine 3 times a day sometime in October.  In January the patient had a cystoscopy by his urologist-Dr. Polanco and eventually wound up having bladder stones\" blasted\" and also wound up having a TURP somewhere around February 14 according to the daughter.  Apparently the patient was placed on Myrbetriq and continues to take that over the last 2 weeks.  The patient has urology follow-up today.    In addition, the patient has been having continued wax in his ears with diminished hearing, weakness, slow speech, fatigue, diminished voice quality, and general unsteadiness according to the daughter.  The patient also complains of a trigger finger of his right index finger.  He apparently has had physical therapy in the past which did little for his generalized weakness.  He tried protein supplementation which also did little for his generalized weakness.  He does care for his wife who requires his assistance and he himself is becoming less and less able to provide that assistance.      Hyperlipidemia   This is a chronic problem. The current episode started more than 1 year ago. The problem is controlled. Recent lipid tests were reviewed and are normal. Current antihyperlipidemic treatment includes statins. The current treatment provides moderate improvement of lipids. There are no compliance " "problems.    Fatigue   This is a chronic (History as outlined above.) problem. Associated symptoms include fatigue and weakness. He has tried nothing for the symptoms.        The following portions of the patient's history were reviewed and updated as appropriate: allergies, current medications, past medical history and problem list.    Review of Systems   Constitutional: Positive for fatigue.   HENT: Negative.    Eyes: Negative.    Respiratory: Negative.    Cardiovascular: Negative.    Genitourinary: Negative.    Musculoskeletal: Negative.         Trigger finger-right index finger as per history.   Skin: Negative.         Onychomycosis is apparent involving the nails of the right hand.  The daughter also states that \"it has affected his toenails also\".   Neurological: Positive for weakness. Negative for tremors.   Psychiatric/Behavioral: Negative.        Objective   Physical Exam   Constitutional: He is oriented to person, place, and time. He appears well-developed and well-nourished. No distress.   HENT:   Head: Normocephalic and atraumatic.   Bilateral external auditory canals occluded with wax   Eyes: Conjunctivae and EOM are normal. Pupils are equal, round, and reactive to light.   Neck: Normal range of motion. Neck supple. No thyromegaly present.   Neck exam negative.  Carotid auscultation normal-no bruits heard.   Cardiovascular: Normal rate, regular rhythm, normal heart sounds and intact distal pulses.  Exam reveals no gallop and no friction rub.    No murmur heard.  Pulmonary/Chest: Effort normal and breath sounds normal. No respiratory distress. He has no wheezes. He has no rales. He exhibits no tenderness.   Musculoskeletal:   Patient is ambulatory without assistance.  Trigger finger-right ring finger by history.   Neurological: He is alert and oriented to person, place, and time.   Psychiatric: His behavior is normal. Judgment and thought content normal.   Although the patient is noted to be awake and " "alert and answering questions appropriately he has a markedly blunted affect.  He does not have any Parkinson's tremors but his affect to me suggests a Parkinson-like situation.   Nursing note and vitals reviewed.      Assessment/Plan   Amaury was seen today for hyperlipidemia, fatigue and unsteadiness.    Diagnoses and all orders for this visit:    Mixed hyperlipidemia  -     Lipid Panel    Weakness generalized  -     CBC & Differential  -     Comprehensive Metabolic Panel  -     TSH+Free T4  -     Ambulatory Referral to Neurology    Fatigue, unspecified type  -     CBC & Differential  -     Comprehensive Metabolic Panel  -     TSH+Free T4  -     Ambulatory Referral to Neurology    General unsteadiness  -     CBC & Differential  -     Comprehensive Metabolic Panel  -     TSH+Free T4  -     Ambulatory Referral to Neurology    Trigger ring finger of right hand  -     Comprehensive Metabolic Panel        Plan: Labs as above for the issues as outlined.  Tentatively we will plan on a four-month follow-up checkup and possible lab update visit.    Continue urology care-Dr. Polanco    We'll make neurology referral per request of the daughter and the patient to Dr. Nikita Simpson for further evaluation of the patient's general weakness and blunted affect suggesting a parkinsonian-like state although he does not have any parkinsonian tremors.    Patient has been advised ENT reevaluation for wax and for complaints of his voice being \"soft\".    I have suggested a hand surgery referral for his trigger finger but at this point the patient declines and wants to \"think about it\".    I have suggested physical therapy referral and treatment for the generalized weakness but according to the daughter he had this recently and it did no good.  Also he cares for his wife who is in need of his 24-hour assistance and it would be impractical for him to go for physical therapy at this time he says.       "

## 2018-03-27 NOTE — PROGRESS NOTES
All labs are within normal / acceptable ranges. Continue all current meds as they are. A followup visit to be seen and have labs updated in six months is advised.

## 2018-05-24 NOTE — PROGRESS NOTES
Date of Office Visit: 2018  Encounter Provider: Terrance Allison MD  Place of Service: [facility] CARDIOLOGY  Patient Name: Amaury Moulton  :1935      Chief Complaint   Patient presents with   • Slow Heart Rate     History of Present Illness    The patient is an 83-year-old white male with a history of bradycardia as well as orthostatic hypotension.  He had been on midodrine for blood pressure support.  However after his TURP and the ability to get off Uroxatral his blood pressure has come up.  He does not complain of any chest pain or peripheral edema or palpitations.  He does have complaints of chronic dyspnea as well as easy fatigability.    Past Medical History:   Diagnosis Date   • Arrhythmia    • Atherosclerotic vascular disease    • Benign prostatic hypertrophy    • Bladder calculus     SEES DR. MOYA - UROL   • Bradycardia    • Cardiomegaly    • History of hepatitis     AGE 21   • History of small bowel obstruction    • Hyperlipidemia    • Limb swelling    • Peripheral edema    • Weakness          Past Surgical History:   Procedure Laterality Date   • CARDIAC PACEMAKER PLACEMENT      Octoberâ€“€“Dr. Allison and associates.   • CATARACT EXTRACTION     • COLONOSCOPY      €“normalâ€“Dr. Ahmet Harvey (surgery)   • CYSTOSCOPY BLADDER STONE LITHOTRIPSY N/A 2018    Procedure: CYSTO LASER DESTRUCTION BLADDER STONES;  Surgeon: Cornelius Moya MD;  Location: [facility];  Service:    • CYSTOSCOPY TRANSURETHRAL RESECTION OF PROSTATE N/A 2018    Procedure: CYSTOSCOPY TRANSURETHRAL RESECTION OF PROSTATE;  Surgeon: Cornelius Moya MD;  Location: [facility];  Service:    • HERNIA REPAIR      €“Dr. Ahmet Johnson€“patient had surgery for a left inguinal hernia however at the time of surgery no hernia was found either internally or externally according to the surgical report.   • TOTAL SHOULDER ARTHROPLASTY W/ DISTAL  "CLAVICLE EXCISION Right 5/15/2017    Procedure: Open Reduction and Internal Fixation Proximal Humerus;  Surgeon: Hugo Way MD;  [facility];  Service:    • TURP / TRANSURETHRAL INCISION / DRAINAGE PROSTATE             Current Outpatient Prescriptions:   •  docusate sodium (COLACE) 100 MG capsule, Take 100 mg by mouth 2 (Two) Times a Day As Needed for Constipation., Disp: , Rfl:   •  fesoterodine fumarate (TOVIAZ ER) 4 MG tablet sustained-release 24 hour tablet, Take 4 mg by mouth Every Other Day., Disp: , Rfl:   •  finasteride (PROSCAR) 5 MG tablet, Take 5 mg by mouth Daily., Disp: , Rfl:       Social History     Social History   • Marital status:      Spouse name: N/A   • Number of children: N/A   • Years of education: N/A     Occupational History   • Not on file.     Social History Main Topics   • Smoking status: Never Smoker   • Smokeless tobacco: Never Used   • Alcohol use No   • Drug use: No   • Sexual activity: Defer     Other Topics Concern   • Not on file     Social History Narrative   • No narrative on file         Review of Systems   Constitution: Positive for malaise/fatigue.   HENT: Negative.    Eyes: Negative.    Cardiovascular: Positive for dyspnea on exertion.   Respiratory: Negative.    Endocrine: Negative.    Skin: Negative.    Musculoskeletal: Negative.    Gastrointestinal: Negative.    Neurological: Positive for light-headedness.   Psychiatric/Behavioral: Negative.        Procedures    Procedures        Objective:    /62   Pulse 65   Ht 185.4 cm (73\")   Wt 78 kg (172 lb)   BMI 22.69 kg/m²         Physical Exam   Constitutional: He is oriented to person, place, and time. He appears well-developed and well-nourished.   HENT:   Head: Normocephalic.   Eyes: Pupils are equal, round, and reactive to light.   Neck: Normal range of motion. No JVD present. Carotid bruit is not present. No thyromegaly present.   Cardiovascular: Normal rate, regular rhythm, S1 normal, S2 " normal, normal heart sounds and intact distal pulses.  Exam reveals no gallop and no friction rub.    No murmur heard.  Pulmonary/Chest: Effort normal and breath sounds normal.   Abdominal: Soft. Bowel sounds are normal.   Musculoskeletal: He exhibits no edema.   Neurological: He is alert and oriented to person, place, and time.   Skin: Skin is warm, dry and intact. No erythema.   Psychiatric: He has a normal mood and affect.   Vitals reviewed.          Assessment:       Diagnosis Plan   1. Bradycardia     2. Orthostatic hypotension         The patient's pacemaker was checked today and is working fine.  There are no issues to report his blood pressure was stable.     Plan:     Continue the same.  Follow-up in one year

## 2018-07-23 PROBLEM — G20 PARKINSON'S DISEASE (HCC): Status: ACTIVE | Noted: 2018-01-01

## 2018-07-23 PROBLEM — G60.9 IDIOPATHIC PERIPHERAL NEUROPATHY: Status: ACTIVE | Noted: 2018-01-01

## 2018-07-23 NOTE — PROGRESS NOTES
CC: Possible Parkinson's disease    HPI:  Amaury Moulton is a  83 y.o.  right-handed white male who was sent by Dr. Sanon for a neurologic consultation regarding slowness of movements, unsteadiness of gait.  The patient has a background of bradycardia status post pacemaker placement, BPH status post TURP with subsequent incontinence who has an approximate 2-3 year history of slowing down of actions perhaps more on the left unsteadiness of gait and some shuffling but also with dizziness especially upon standing.  The dizziness was found to be associated with severe orthostatic hypotension probably at least in part related to Flomax.  There was improvement plan Flomax was stopped but he still has it to some degree.  It's worse when he stands up.  He does not have tremor    In 5/2017 he fell and fractured her shoulder.  About 2 weeks ago he fell and hit his head left frontal area but didn't lose consciousness.  He does not have history of stroke meningitis seizures or syncope.  There is positive family history of stroke and his mother about age 80 and no one with Parkinson's disease.  No one with brain tumor brain hemorrhage or aneurysm of a brain artery.  (He is unaware of his father's side of the family)        Past Medical History:   Diagnosis Date   • Arrhythmia    • Atherosclerotic vascular disease    • Benign prostatic hypertrophy    • Bladder calculus     SEES DR. MOYA - UROL   • Bradycardia    • Cardiomegaly    • History of hepatitis     AGE 21   • History of small bowel obstruction    • Hyperlipidemia    • Limb swelling    • Peripheral edema    • Weakness          Past Surgical History:   Procedure Laterality Date   • CARDIAC PACEMAKER PLACEMENT      Octoberâ€“2014â€“Dr. Allison and associates.   • CATARACT EXTRACTION     • COLONOSCOPY      January 2015â€“normalâ€“Dr. Ahmet Harvey (surgery)   • CYSTOSCOPY BLADDER STONE LITHOTRIPSY N/A 2/14/2018    Procedure: CYSTO LASER DESTRUCTION BLADDER STONES;  Surgeon:  Cornelius Polanco MD;  Location: [facility] OR;  Service:    • CYSTOSCOPY TRANSURETHRAL RESECTION OF PROSTATE N/A 2/14/2018    Procedure: CYSTOSCOPY TRANSURETHRAL RESECTION OF PROSTATE;  Surgeon: Cornelius Polanco MD;  Location: [facility];  Service:    • HERNIA REPAIR      October 2014â€“Dr. Ahmet Johnson€“patient had surgery for a left inguinal hernia however at the time of surgery no hernia was found either internally or externally according to the surgical report.   • TOTAL SHOULDER ARTHROPLASTY W/ DISTAL CLAVICLE EXCISION Right 5/15/2017    Procedure: Open Reduction and Internal Fixation Proximal Humerus;  Surgeon: Hugo Way MD;  Location: [facility];  Service:    • TURP / TRANSURETHRAL INCISION / DRAINAGE PROSTATE             Current Outpatient Prescriptions:   •  fesoterodine fumarate (TOVIAZ ER) 4 MG tablet sustained-release 24 hour tablet, Take 4 mg by mouth Every Other Day., Disp: , Rfl:   •  finasteride (PROSCAR) 5 MG tablet, Take 5 mg by mouth Daily., Disp: , Rfl:   •  carbidopa-levodopa (SINEMET)  MG per tablet, Take 1 tablet by mouth 3 (Three) Times a Day., Disp: 90 tablet, Rfl: 3  •  docusate sodium (COLACE) 100 MG capsule, Take 100 mg by mouth 2 (Two) Times a Day As Needed for Constipation., Disp: , Rfl:       Family History   Problem Relation Age of Onset   • Stroke Mother    • Malig Hyperthermia Neg Hx          Social History     Social History   • Marital status:      Spouse name: N/A   • Number of children: N/A   • Years of education: N/A     Occupational History   • Not on file.     Social History Main Topics   • Smoking status: Never Smoker   • Smokeless tobacco: Never Used   • Alcohol use No   • Drug use: No   • Sexual activity: Defer     Other Topics Concern   • Not on file     Social History Narrative   • No narrative on file         Allergies   Allergen Reactions   • No Known Drug Allergy          Pain Scale: 2/10, headache        ROS:  Review of Systems  "  Constitutional: Positive for activity change and fatigue. Negative for appetite change.   HENT: Positive for drooling, hearing loss, rhinorrhea and voice change. Negative for ear pain and facial swelling.    Eyes: Positive for photophobia. Negative for pain, redness and itching.   Respiratory: Negative for cough, choking and shortness of breath.    Gastrointestinal: Negative for abdominal pain, nausea and vomiting.   Endocrine: Negative for cold intolerance and heat intolerance.   Genitourinary: Positive for decreased urine volume, difficulty urinating, enuresis, frequency and urgency.   Musculoskeletal: Positive for gait problem.   Skin: Negative for color change, pallor, rash and wound.   Allergic/Immunologic: Negative for environmental allergies and food allergies.   Neurological: Positive for dizziness, weakness, light-headedness and headaches. Negative for tremors, seizures, syncope, facial asymmetry, speech difficulty and numbness.   Hematological: Negative for adenopathy. Does not bruise/bleed easily.   Psychiatric/Behavioral: Negative for agitation, behavioral problems, confusion, decreased concentration, dysphoric mood, hallucinations, self-injury, sleep disturbance and suicidal ideas. The patient is not nervous/anxious and is not hyperactive.            Physical Exam:  Vitals:    07/23/18 1307   Weight: 78 kg (172 lb)   Height: 185.4 cm (73\")     Supine blood pressure left arm 1:30/80; standing blood pressure left arm 104/70    Body mass index is 22.69 kg/m².    Physical Exam  Gen.: Thin white male no acute distress  HEENT: There is some excoriation of the left frontal area.  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  No A-V nicking.  Throat negative.  Neck: Supple.  No thyromegaly.  No cervical bruits.  Radial pulses were strong and simultaneous.  Heart: Regular rate and rhythm without murmurs  Lower extremities show mild pedal edema       Neurological Exam:    " status: Awake alert oriented conversant friends good history without evidence of an affective disorder thought disorder delusions or hallucinations.  HCF: No aphasia or apraxia or dysarthria but his volume is low and monotone.  Recent and remote recall intact.  Knowledge of recent events intact  Cranial nerves: 2-12 intact with some reduced upgaze.  Motor: Cogwheeling in both arms and legs.  Strength testing reveals some weakness in the toes and perhaps left tibialis anterior.  Remaining muscles have normal strength  Reflexes +1 diffusely with downgoing toe signs  Sensory: Some reduction in the feet.  Sensation pretty good in the hands.  Vibration is significantly reduced at the ankles and moderately reduced at the knees.  Gait and Station: Comes to stand without significant difficulty.  Step length is a little shortened.  Arm swing is reduced.  No tremor noted.  He has a mildly flexed forward posture at the hips and especially the neck.          Results:      Lab Results   Component Value Date    GLUCOSE 89 02/18/2018    BUN 29 (H) 03/26/2018    CREATININE 1.39 (H) 03/26/2018    EGFRIFNONA 49 (L) 03/26/2018    EGFRIFAFRI 59 (L) 03/26/2018    BCR 20.9 03/26/2018    CO2 28.9 03/26/2018    CALCIUM 9.1 03/26/2018    PROTENTOTREF 7.6 03/26/2018    ALBUMIN 3.80 03/26/2018    LABIL2 1.0 03/26/2018    AST 21 03/26/2018    ALT 10 03/26/2018       Lab Results   Component Value Date    WBC 7.73 03/26/2018    HGB 12.7 (L) 03/26/2018    HCT 40.9 03/26/2018    MCV 99.5 (H) 03/26/2018     03/26/2018         .No results found for: RPR      Lab Results   Component Value Date    TSH 1.580 03/26/2018         Lab Results   Component Value Date    WKPTJPVL28 773 09/22/2017         No results found for: FOLATE      No results found for: HGBA1C            Assessment:   1. Parkinsons disease  2. ? Minor L predominance vs superimposed structural lesion.  3. Orthostasis           Plan:  1. Trial of C/L 25/100, 1/2 tab tid for 1-2  weeks then 1 tab po tid. Side effects were reviewed  2. CT brain R/O structural lesion  3. Labs for common treatable causes of neuropathy  4. F/U in 4 weeks with Daly Lam NP  5. Support hose was discussed. Salt and water. Florinef and midodrine were discussed.                        Dictated utilizing Dragon dictation.

## 2018-08-03 NOTE — PROGRESS NOTES
Amaury Moulton is a 83 y.o. male who presents with   Chief Complaint   Patient presents with   • Low blood pressure/dizziness/lightheadedness   • Parkinson's Disease   .    83-year-old male presents for a general checkup.  He currently is seeing neurology (Dr. Mayberry) and multiple lab tests are going to be done in the near future therefore we will not do any lab test on today's visit.  The patient recently has been diagnosed with Parkinson's disease and currently has started Sinemet.  He does complain of being tired and having no energy.  He also is dizzy when he is up and apparently is having symptoms of orthostasis.  He is not on any antihypertensive therapy.      Parkinson's Disease   This is a new problem. The current episode started more than 1 month ago. The problem occurs constantly. The problem has been unchanged. Associated symptoms include fatigue and weakness. Treatments tried: Currently taking Sinemet as per neurology prescription. The treatment provided no relief.        The following portions of the patient's history were reviewed and updated as appropriate: allergies, current medications, past medical history and problem list.    Review of Systems   Constitutional: Positive for fatigue.   HENT: Negative.    Eyes: Negative.    Respiratory: Negative.    Cardiovascular: Negative.    Genitourinary: Negative.    Musculoskeletal: Negative.    Skin: Negative.    Neurological: Positive for dizziness and weakness.   Psychiatric/Behavioral: Negative.        Objective   Physical Exam   Constitutional: He is oriented to person, place, and time. He appears well-developed and well-nourished. No distress.   HENT:   Head: Normocephalic and atraumatic.   Eyes: Pupils are equal, round, and reactive to light. Conjunctivae and EOM are normal.   Neck: Normal range of motion. Neck supple. No thyromegaly present.   Neck exam negative.  Carotid auscultation normal-no bruits heard.   Cardiovascular: Normal rate, regular  rhythm, normal heart sounds and intact distal pulses.  Exam reveals no gallop and no friction rub.    No murmur heard.  Pulmonary/Chest: Effort normal and breath sounds normal. No respiratory distress. He has no wheezes. He has no rales. He exhibits no tenderness.   Neurological: He is alert and oriented to person, place, and time.   Patient is noted to have a blunted affect.  His speech is slowed.  He walks with a shuffling, parkinsonian gait and does not swing his arms in a normal way.   Psychiatric: He has a normal mood and affect. His behavior is normal. Judgment and thought content normal.   Nursing note and vitals reviewed.      Assessment/Plan   Amaury was seen today for low blood pressure/dizziness/lightheadedness and parkinson's disease.    Diagnoses and all orders for this visit:    Orthostatic hypotension    Parkinson's disease (CMS/[facility])      Plan: It would appear to me that this patient's general downward progression in terms of lowering of his blood pressure, his general fatigue, his dizziness, his weakness, his poor appetite all are related to his Parkinson's disease.  Hopefully these will improve to some degree when with the Sinemet usage.    The patient is very susceptible to falling and we discussed using a walker but apparently there is too much in the house for a walker to be used safely according to the patient's daughter.  The patient himself is reluctant to use a cane because he feels that it would impair his ability to carry packages from the front of the house around to the back of the house.  No matter what I suggested as a way to help him ambulate more safely he had a reason why he couldn't do it.    For now he will continue all currently prescribed medications as they are.  We will see him in December for a checkup/lab update visit then.

## 2018-08-06 NOTE — TELEPHONE ENCOUNTER
----- Message from Hortensia Nino sent at 8/6/2018  2:24 PM EDT -----  Contact: [phone]  Please call Lizabeth Bundy , daughter, with result of CT..  Also she wants an appointment with paul on the August 23 or 24.  I got them one on august 21.  She is in the hospital on the 23 and 23

## 2018-08-21 NOTE — PROGRESS NOTES
CC: F/U for Parkinson's disease and peripheral neuropathy    HPI:  Amaury Moulton is a  83 y.o.  RH-handed male with PMH HLD, BPH s/p TURP with ongoing incontinence, bradycardia s/p PPM, orthostatic hypotension being seen in f/u today for Parkinson's and peripheral neuropathy. The patient is accompanied by his daughter, Lizabeth, who lives 3 hours away. The pt was seen by Dr Mayberry 7/23/2018. In review, the pt's mobility was first noticed to be declining in Fall 2016. In 5/2017 he slipped on a wet boat dock abd fractured his shoulder. His daughter noted that he had some hallucinations after the should fracture, before medication for pain was even started. He has also had issues with orthostatic hypotension at least in part related to flomax for BPH. He was started on midodrine which he was able to come off of after his TURP in 2/2018. In July he had another fall hitting his head without loss of consciousness. He was moving a package into his house and missed a step.    When Dr Mayberry examined the patient it was noted that the pt had bilateral cog-wheeling, decreased arm swing, shortened step-length and mildly flexed forward posture. He was started on carbidopa/levodopa 25/100 1 tab TID. He has only been on the full dose about 2 weeks. Initially his dizziness seemed a little worse, especially with prolonged standing when doing the dishes, but the dizziness seems to have leveled out to baseline. The patient does not particularly notice any significant improvement in gait/movement but his daughter feels his voice is stronger.      Labs for treatable causes of peripheral neuropathy: hgb a1C was 5.1%, TSH 1.9, Free T4 was 1.23, foalte was >20, SPEP and IEP were negative for MGUS, RPR was nonreactive, heavy metal screen was negative, and copper was normal at 100. Sedimentation rate was abnormal at 61.    The patient's daughter expressed concerns about his ability to navigate his house and his access to resources related to  "difficulties with pt's wife. Apparently in his house there is only a very small pathway for him to navigate. He frequently has to move packages from outside the front door by going out the back door. He must navigate stairs while carrying packages. While pt and daughter feel PT would be helpful pt does not feel it is an option at this time due to caring for his wife. He also admits to not drinking enough fluid daily. He has declined a \"life alert\" device offered by his daughter.    Pt denies hallucinations other than occasionally waking thinking he has heard his wife calling for him. He still continues to be fatigued and feel generally weak.    He had a 14 pt drop in his SBP from sitting to standing today.  Past Medical History:   Diagnosis Date   • Arrhythmia    • Atherosclerotic vascular disease    • Benign prostatic hypertrophy    • Bladder calculus     SEES DR. POLANCO - UROL   • Bradycardia    • Cardiomegaly    • History of hepatitis     AGE 21   • History of small bowel obstruction    • Hyperlipidemia    • Limb swelling    • Peripheral edema    • Weakness          Past Surgical History:   Procedure Laterality Date   • CARDIAC PACEMAKER PLACEMENT      Octoberâ€“2014â€“Dr. Allison and associates.   • CATARACT EXTRACTION     • COLONOSCOPY      January 2015â€“normalâ€“Dr. Ahmet Harvey (surgery)   • CYSTOSCOPY BLADDER STONE LITHOTRIPSY N/A 2/14/2018    Procedure: CYSTO LASER DESTRUCTION BLADDER STONES;  Surgeon: Cornelius Polnaco MD;  Location: [facility];  Service:    • CYSTOSCOPY TRANSURETHRAL RESECTION OF PROSTATE N/A 2/14/2018    Procedure: CYSTOSCOPY TRANSURETHRAL RESECTION OF PROSTATE;  Surgeon: Cornelius Polanco MD;  Location: [facility];  Service:    • HERNIA REPAIR      October 2014â€“Dr. Ahmet Harveyâ€“patient had surgery for a left inguinal hernia however at the time of surgery no hernia was found either internally or externally according to the surgical report.   • TOTAL SHOULDER " ARTHROPLASTY W/ DISTAL CLAVICLE EXCISION Right 5/15/2017    Procedure: Open Reduction and Internal Fixation Proximal Humerus;  Surgeon: Hugo Way MD;  Location: [facility] OR;  Service:    • TURP / TRANSURETHRAL INCISION / DRAINAGE PROSTATE             Current Outpatient Prescriptions:   •  carbidopa-levodopa (SINEMET)  MG per tablet, Take 1 tablet by mouth 3 (Three) Times a Day., Disp: 90 tablet, Rfl: 3  •  docusate sodium (COLACE) 100 MG capsule, Take 100 mg by mouth 2 (Two) Times a Day As Needed for Constipation., Disp: , Rfl:   •  fesoterodine fumarate (TOVIAZ ER) 4 MG tablet sustained-release 24 hour tablet, Take 4 mg by mouth Every Other Day., Disp: , Rfl:   •  finasteride (PROSCAR) 5 MG tablet, Take 5 mg by mouth Daily., Disp: , Rfl:       Family History   Problem Relation Age of Onset   • Stroke Mother    • Malig Hyperthermia Neg Hx          Social History     Social History   • Marital status:      Spouse name: N/A   • Number of children: N/A   • Years of education: N/A     Occupational History   • Not on file.     Social History Main Topics   • Smoking status: Never Smoker   • Smokeless tobacco: Never Used   • Alcohol use No   • Drug use: No   • Sexual activity: Defer     Other Topics Concern   • Not on file     Social History Narrative   • No narrative on file         Allergies   Allergen Reactions   • No Known Drug Allergy            ROS:  Review of Systems   Constitutional: Positive for fatigue. Negative for chills and fever.   HENT: Negative for hearing loss, tinnitus and trouble swallowing.    Eyes: Positive for visual disturbance. Negative for pain, redness and itching.   Respiratory: Negative for cough, shortness of breath and wheezing.    Cardiovascular: Positive for leg swelling (left leg more than right, nothing new). Negative for chest pain and palpitations.   Gastrointestinal: Positive for constipation. Negative for diarrhea, nausea and vomiting.   Endocrine: Negative for  "cold intolerance, heat intolerance and polydipsia.   Genitourinary: Negative for decreased urine volume, difficulty urinating, frequency and urgency.   Musculoskeletal: Positive for gait problem. Negative for back pain, neck pain and neck stiffness.   Skin: Negative for color change, rash and wound.   Allergic/Immunologic: Negative for environmental allergies, food allergies and immunocompromised state.   Neurological: Positive for dizziness, tremors, weakness and headaches. Negative for seizures, syncope, facial asymmetry, speech difficulty, light-headedness and numbness.   Hematological: Negative for adenopathy. Bruises/bleeds easily.   Psychiatric/Behavioral: Positive for confusion and sleep disturbance. Negative for agitation, behavioral problems, decreased concentration, dysphoric mood, hallucinations, self-injury and suicidal ideas. The patient is not nervous/anxious and is not hyperactive.            Physical Exam:  Vitals:    08/21/18 1400   BP: 118/62   Pulse: 71   SpO2: 96%   Weight: 74.8 kg (165 lb)   Height: 185.4 cm (72.99\")     Body mass index is 21.78 kg/m².   Sitting /70  Standing BP 90/72    Physical Exam  General appearance: Well developed, well nourished, well groomed, alert and cooperative.   HEENT: Normocephalic.   Neck and spine: Normal range of motion. Normal alignment. No mass or tenderness.    Cardiac: Regular rate and rhythm.   Peripheral Vasculature: Extremities warm and dry.  Chest Exam: Clear to auscultation bilaterally, no wheezes, no rhonchi.  Extremities: Normal, no edema.   Skin: No rashes or birthmarks.       Neurological Exam:   Higher integrative function: Oriented to self, location, and situation. Stated it was 8/18/2018. Required cuing to name the current president. No dysarthria or aphasia. + hypophonic.  Masked faces with decreased frequency of blinking.  CN II: Normal visual fields.   CN III IV VI: Extraocular movements are full without nystagmus. Pupils are equal, " round, and reactive to light.    CN V: Normal facial sensation.  CN VII: Facial movements are symmetric, no weakness.   CN VIII: Auditory acuity is normal.   CN IX & X: Symmetric palatal movement.   CN XI: Sternocleidomastoid and trapezius are normal. No weakness.   CN XII: The tongue is midline. No atrophy or fasciculations.   Motor: Normal muscle strength in upper and lower extremities. No tremor noted. BUE cog-wheeling.  Sensation: Decreased light touch and vibratory sensation in feet.  Station and gait: Mildly decreased step-length, decreased arm swing particularly on the left. En bloc turning.   Muscle stretch reflexes: Reflexes are normal and symmetric in the upper and lower extremities.   Coordination: Finger to nose test showed no dysmetria.             Results:      Lab Results   Component Value Date    GLUCOSE 89 02/18/2018    BUN 29 (H) 03/26/2018    CREATININE 1.39 (H) 03/26/2018    EGFRIFNONA 49 (L) 03/26/2018    EGFRIFAFRI 59 (L) 03/26/2018    BCR 20.9 03/26/2018    CO2 28.9 03/26/2018    CALCIUM 9.1 03/26/2018    PROTENTOTREF 7.7 08/06/2018    ALBUMIN 3.5 08/06/2018    LABIL2 0.8 08/06/2018    AST 21 03/26/2018    ALT 10 03/26/2018       Lab Results   Component Value Date    WBC 7.73 03/26/2018    HGB 12.7 (L) 03/26/2018    HCT 40.9 03/26/2018    MCV 99.5 (H) 03/26/2018     03/26/2018         .  Lab Results   Component Value Date    RPR Non-Reactive 08/06/2018         Lab Results   Component Value Date    TSH 1.960 08/06/2018         Lab Results   Component Value Date    IKNXIXXH96 806 08/06/2018         Lab Results   Component Value Date    FOLATE >20.00 08/06/2018         Lab Results   Component Value Date    HGBA1C 5.10 08/06/2018     NONCONTRAST HEAD CT 08/03/2018 I viewed CT head and agree with findings.      CLINICAL HISTORY: Left-sided weakness, Parkinson disease, unsteady gait.     TECHNIQUE: Spiral CT images were obtained from the base of the skull to  the vertex without intravenous  contrast. Images were reformatted and  submitted in 3 mm thick axial CT section with brain algorithm and 2 mm  thick sagittal and coronal reconstructions were performed with brain  algorithm.     FINDINGS: There is some mild low-density in the periventricular white  matter consistent with mild small vessel disease. The remainder of the  brain parenchyma is normal in attenuation. There is minimal cerebral  atrophy.  THe lateral and third ventricles are mildly prominent in size,  felt to be due to some mild central volume loss.  I see no mass effect  and no midline shift.  No extra-axial fluid collections are identified  and there is no evidence of acute intracranial hemorrhage. The paranasal  sinuses, the mastoid air cells and middle ear cavities are clear.  The  calvarium, skull base and orbits are unremarkable.     IMPRESSION:  There is mild small vessel disease in the cerebral white  matter, mild diffuse cerebral atrophy, and the lateral and third  ventricles are mildly prominent size, which is felt to be on the basis  of central volume loss or atrophy. The remainder of the head CT is  within normal limits.     Radiation dose reduction techniques were utilized, including automated  exposure control and exposure modulation based on body size.     This report was finalized on 8/6/2018 8:11 AM by Dr. Steve Guillory M.D.    Assessment/Plan  1. Parkinson's disease.   - CT head showed mild atrophy.   - Pt unsure if he has had any improvement from C/L 25/100 1 tab TID, but is not currently having any significant S/Es, so will continue it for now. Refill sent today.  - Will refer pt to PT when he feels like he will be able to participate from a schedule standpoint.  - Pt and daughter to see if packages can start to be delivered to back door.     2. Peripheral neuropathy- idiopathic at this point, as labs for treatable causes of neuropathy were unrevealing. Can consider EMG/NCS if progresses.    3. Orthostatic hypotension  -  Discussed importance of staying hydrated  - Counseled on sitting/standing slowly and moving legs to use muscles with prolonged standing.   - Pt does not feel he would be able to put on his own AMARI hose and he has no help.   - Discussed getting BP machine and monitoring his own BP.     Follow up with myself or Dr Mayberry in 3 months, or sooner if needed.

## 2018-10-01 NOTE — TELEPHONE ENCOUNTER
----- Message from Selina Romo sent at 10/1/2018 10:22 AM EDT -----  Contact: daughter - Lizabeth Bundy - [phone]  Pt has seen Dr. Mayberry and Daly. Daughter reports pt had an episode Saturday morning. Pt reported to daughter that he saw a flash of light, became dizzy and fell.  He never lost   Consciousness and he was walking and talking fine afterwards.  Daughter did say that he was a little more unsteady than usual the rest of the day and he slept a big part of the day. He was much better Sunday.  Daughter stated that patient missed full doses of his carbidopa-levodopa on three occasions last week, but that he has had all of his full doses since Friday.

## 2018-10-01 NOTE — TELEPHONE ENCOUNTER
Did he injure anything when he fell? Did he hit his head?  Has he been able to monitor his BP at home? If so what was it on the day he fell?  He should be evaluated in our office or by PCP if BP low or if he is still unsteady.

## 2018-10-03 NOTE — TELEPHONE ENCOUNTER
Spoke with Lizabeth (daughter) pt is better thinking it maybe due to not taking meds for 5 days. He did not hit his head, they do not check bp at home. Will make appt. As soon as we can. Has to be made with daughter since she does not live here and she is the one to bring him.

## 2018-11-15 NOTE — PROGRESS NOTES
CC:    HPI:  Amaury Moulton is a  83 y.o.  RH-handed male with past medical history of hyperlipidemia, BPH status post TURP with ongoing incontinence, bradycardia status post PPM, peripheral neuropathy, and orthostatic hypotension who is being seen in follow-up today for Parkinson's and dizziness.  He is accompanied by his daughter Lizabeth today.  He was last seen by me August 21st 2018.  In review, the patient's mobility was first noticed to be declining in the fall 2016.  In May 2017 he slipped on [place] wet boat [place] and fractured his shoulder and he had some hallucinations after the shoulder fracture even prior to starting pain medication. He had issues with orthostatic hypotension at least in part related to taking Flomax for BPH.  He was on midodrine at one time which he was able to stop after his TURP in February 2018.  He had a CT of the head showing mild atrophy.  When seen by Dr. Mayberry in July 2018 was noted that the patient had bilateral cogwheeling, decreased arm swing, shortened step length, and mildly flexed forward posture so he was started on carbidopa/levodopa 25/100 one tab 3 times a day, he is still taking this dose.  He has had labs drawn for treatable causes of peripheral neuropathy which was unrevealing other than an elevated sedimentation rate of 61.    Since I saw him and his wife has been placed in hospice care within their home.  He continues to have continues to have poor access to resources given his wife's psychiatric illness.  He often has little by mouth intake of food or water.  His daughter has been in town for a few days and she states that he has recently been eating more.  He has had a fall in the kitchen which occurred after seeing a flash of light.  No loss of consciousness and no associated weakness.  He continues to have dizziness which is not necessarily lightheadedness but more of a staggering off balance feeling.  He has carsickness when riding in the car.  Tinnitus seems worse in  the mornings.  His daughter has given him meclizine tablet which seemed to help.  We have talked about getting a blood pressure cuff for him to monitor his blood pressure at home but he has been unable to do this.    Last saw him it was unclear whether the Sinemet was helping.  However the patient recently missed 5 doses of Sinemet and it was very clear to the daughter that he was having more difficulty ambulating and he had increased stiffness.  Patient has been offered physical therapy services in the past however he has declined this.    He was recently seen again in the ENT office and had both of his ears cleaned out.    Past Medical History:   Diagnosis Date   • Arrhythmia    • Atherosclerotic vascular disease    • Benign prostatic hypertrophy    • Bladder calculus     SEES DR. MOYA - UROL   • Bradycardia    • Cardiomegaly    • History of hepatitis     AGE 21   • History of small bowel obstruction    • Hyperlipidemia    • Limb swelling    • Peripheral edema    • Weakness          Past Surgical History:   Procedure Laterality Date   • CARDIAC PACEMAKER PLACEMENT      Octoberâ€“2014â€“Dr. [facility] and [facility].   • CATARACT EXTRACTION     • COLONOSCOPY      January 2015â€“normalâ€“Dr. Ahmet Harvey (surgery)   • HERNIA REPAIR      October 2014â€“Dr. Ahmet Johnson€“patient had surgery for a left inguinal hernia however at the time of surgery no hernia was found either internally or externally according to the surgical report.   • TURP / TRANSURETHRAL INCISION / DRAINAGE PROSTATE             Current Outpatient Medications:   •  carbidopa-levodopa (SINEMET)  MG per tablet, Take 1 tablet by mouth 3 (Three) Times a Day., Disp: 90 tablet, Rfl: 3  •  docusate sodium (COLACE) 100 MG capsule, Take 100 mg by mouth 2 (Two) Times a Day As Needed for Constipation., Disp: , Rfl:   •  fesoterodine fumarate (TOVIAZ ER) 4 MG tablet sustained-release 24 hour tablet, Take 4 mg by mouth Every Other Day., Disp: , Rfl:   •   finasteride (PROSCAR) 5 MG tablet, Take 5 mg by mouth Daily., Disp: , Rfl:       Family History   Problem Relation Age of Onset   • Stroke Mother    • Malig Hyperthermia Neg Hx          Social History     Socioeconomic History   • Marital status:      Spouse name: Not on file   • Number of children: Not on file   • Years of education: Not on file   • Highest education level: Not on file   Social Needs   • Financial resource strain: Not on file   • Food insecurity - worry: Not on file   • Food insecurity - inability: Not on file   • Transportation needs - medical: Not on file   • Transportation needs - non-medical: Not on file   Occupational History   • Not on file   Tobacco Use   • Smoking status: Never Smoker   • Smokeless tobacco: Never Used   Substance and Sexual Activity   • Alcohol use: No   • Drug use: No   • Sexual activity: Defer   Other Topics Concern   • Not on file   Social History Narrative   • Not on file         Allergies   Allergen Reactions   • No Known Drug Allergy          ROS:  Review of Systems   Constitutional: Positive for fatigue. Negative for activity change, appetite change, chills, diaphoresis, fever and unexpected weight change.   HENT: Positive for tinnitus (just started recently). Negative for drooling, hearing loss, trouble swallowing and voice change.    Eyes: Negative for photophobia, pain and visual disturbance.   Respiratory: Negative for chest tightness and shortness of breath.    Cardiovascular: Negative for chest pain, palpitations and leg swelling.   Gastrointestinal: Negative for blood in stool, nausea and vomiting.   Endocrine: Negative for cold intolerance and heat intolerance.   Genitourinary: Negative for difficulty urinating.   Musculoskeletal: Positive for gait problem and neck pain. Negative for neck stiffness.   Skin: Negative for rash.   Neurological: Positive for dizziness (every day or a couple times a day), speech difficulty, weakness, light-headedness and  "headaches. Negative for tremors, seizures, syncope, facial asymmetry and numbness.   Hematological: Does not bruise/bleed easily.   Psychiatric/Behavioral: Positive for confusion. Negative for agitation, behavioral problems, hallucinations, sleep disturbance and suicidal ideas. The patient is not nervous/anxious.            Physical Exam:  Vitals:    11/19/18 1306   BP: 130/80   BP Location: Left arm   Patient Position: Sitting   Cuff Size: Adult   Pulse: 94   SpO2: 97%   Weight: 74.8 kg (165 lb)   Height: 185.4 cm (72.99\")     Body mass index is 21.77 kg/m².  Weight is stable from the previous visit.    Physical Exam     General appearance: Well developed, well nourished, well groomed, alert and cooperative.   HEENT: Normocephalic.   Neck and spine: Normal range of motion. Normal alignment. No mass or tenderness.    Cardiac: Regular rate and rhythm. No murmurs.   Peripheral Vasculature: Extremities warm and dry.  Chest Exam: Clear to auscultation bilaterally, no wheezes, no rhonchi.  Extremities: Normal, no edema.   Skin: No rashes or birthmarks.     Neurological Exam:   Higher integrative function: Oriented to time, place, person, intact recent and remote memory, attention span, concentration and language. Spontaneous speech, fund of vocabulary are normal. Flat affect, masked face. Decreased blink frequency. Hypophonic.  CN II: Normalvisual fields.   CN III IV VI: Extraocular movements are full without nystagmus. Pupils are equal, round, and reactive to light. No relative afferent pupillary defect. No internuclear ophthalmoplegia.   CN V: Normal facial sensation.  CN VII: Facial movements are symmetric, no weakness.   CN VIII: Auditory acuity is decreased to finger rub on the left.   CN IX & X: Symmetric palatal movement.   CN XI: Sternocleidomastoid and trapezius are normal. No weakness.   CN XII: The tongue is midline. No atrophy or fasciculations.   Motor: Normal muscle strength, bulk, and tone in upper and " lower extremities. BUE cog-wheeling, increased tone through out, worse in left arm/leg.  Sensation: Normal light touch. Romberg negative.  Station and gait: Decreased step length, , decreased arm swing, and en bloc turning.   Muscle stretch reflexes: Reflexes are normal and symmetric in the upper and lower extremities.   Coordination: Finger to nose test showed no dysmetria.         Results:      Lab Results   Component Value Date    GLUCOSE 89 02/18/2018    BUN 29 (H) 03/26/2018    CREATININE 1.39 (H) 03/26/2018    EGFRIFNONA 49 (L) 03/26/2018    EGFRIFAFRI 59 (L) 03/26/2018    BCR 20.9 03/26/2018    CO2 28.9 03/26/2018    CALCIUM 9.1 03/26/2018    PROTENTOTREF 7.7 08/06/2018    ALBUMIN 3.5 08/06/2018    LABIL2 0.8 08/06/2018    AST 21 03/26/2018    ALT 10 03/26/2018       Lab Results   Component Value Date    WBC 7.73 03/26/2018    HGB 12.7 (L) 03/26/2018    HCT 40.9 03/26/2018    MCV 99.5 (H) 03/26/2018     03/26/2018         .  Lab Results   Component Value Date    RPR Non-Reactive 08/06/2018         Lab Results   Component Value Date    TSH 1.960 08/06/2018         Lab Results   Component Value Date    NVOCYOHR37 806 08/06/2018         Lab Results   Component Value Date    FOLATE >20.00 08/06/2018         Lab Results   Component Value Date    HGBA1C 5.10 08/06/2018               Assessment:   Mr. Moulton was seen today in follow-up for his Parkinson's disease.          Plan:  Continue carbidopa/levodopa 25/100 one tab by mouth 3 times a day.  The patient was encouraged again to check his blood pressure at home at least daily and especially in episodes of increased dizziness.  He was also offered PT services again for balance.  He was encouraged to increase food and water intake.  Follow-up in 3 months or sooner if needed.    Greater than 15 of the 25 minutes of this face-to-face visit was spent in counseling on safety, nutrition, and disease course of Parkinson's.                          Dictated  utilizing Dragon dictation.

## 2018-11-19 PROBLEM — R35.1 NOCTURIA: Status: ACTIVE | Noted: 2018-01-01

## 2018-11-19 NOTE — PROGRESS NOTES
Amaury Moulton is a 83 y.o. male who presents with   Chief Complaint   Patient presents with   • Orthostasis     Likely from Parkinson's disease according to what he has been told by neurology according to the daughter.   • Hyperlipidemia     Currently on no medications for lipids.  Lipids from 6 months ago acceptable.  Patient is not fasting and prefers to avoid lab testing today.   .    83-year-old male.  History as above.  Requests deferment of lab testing today since neurologist just recently performed some.  Patient's daughter discussed the patient's orthostatic symptoms which apparently is from poor fluid intake along with Parkinson's disease and possibly even the Sinemet that he takes for the Parkinson's.  Apparently he has tried Midodrine in the past but it was discontinued by neurology according to the daughter..      Hyperlipidemia   This is a chronic problem. The current episode started more than 1 year ago. The problem is controlled. Recent lipid tests were reviewed and are normal.        The following portions of the patient's history were reviewed and updated as appropriate: allergies, current medications, past medical history and problem list.    Review of Systems   Constitutional: Negative.    HENT: Negative.    Eyes: Negative.    Respiratory: Negative.    Cardiovascular: Negative.    Genitourinary: Negative.    Musculoskeletal: Negative.    Skin: Negative.    Neurological: Negative.    Psychiatric/Behavioral: Negative.        Objective   Physical Exam   Constitutional: He is oriented to person, place, and time. He appears well-developed and well-nourished. No distress.   HENT:   Head: Normocephalic and atraumatic.   Eyes: Conjunctivae and EOM are normal. Pupils are equal, round, and reactive to light.   Neck: Normal range of motion. Neck supple. No thyromegaly present.   Neck exam negative.  Carotid auscultation normal-no bruits heard.   Cardiovascular: Normal rate, regular rhythm, normal heart  sounds and intact distal pulses. Exam reveals no gallop and no friction rub.   No murmur heard.  Pulmonary/Chest: Effort normal and breath sounds normal. No respiratory distress. He has no wheezes. He has no rales. He exhibits no tenderness.   Neurological: He is alert and oriented to person, place, and time.   Psychiatric: His behavior is normal. Judgment and thought content normal.   Patient with blunted affect compatible with known history of Parkinson's disease.   Nursing note and vitals reviewed.      Assessment/Plan   Amaury was seen today for orthostasis and hyperlipidemia.    Diagnoses and all orders for this visit:    Orthostasis    Mixed hyperlipidemia      Plan: Continue neurology care, treatment, management of Parkinson's disease and ramifications thereof.    We'll defer lab testing today but we will see him in May for a general checkup and lab updates then.    Continue all current treatment as prescribed.

## (undated) DEVICE — BAG,DRAINAGE,4L,A/R TOWER,LL,SLIDE TAP: Brand: MEDLINE

## (undated) DEVICE — SUT SILK 2/0 TIES 18IN A185H

## (undated) DEVICE — MAT FLR ABSORBENT LG 4FT 10 2.5FT

## (undated) DEVICE — ADHS SKIN DERMABOND TOP ADVANCED

## (undated) DEVICE — BIT DRL QC DIA 2.5X110MM

## (undated) DEVICE — FIBR LASR HOLMIUM ACCUMAX 1000 1PT USE

## (undated) DEVICE — TIDISHIELD UROLOGY DRAIN BAGS FROSTY VINYL STERILE FITS SIEMENS UROSKOP ACCESS 20 PER CASE: Brand: TIDISHIELD

## (undated) DEVICE — DRSNG SURESITE WNDW 4X4.5

## (undated) DEVICE — SUT VIC 2/0 CT2 27IN J269H

## (undated) DEVICE — Device

## (undated) DEVICE — CATH FOL BARDEX HEMATURIA 3WY 24F 30CC

## (undated) DEVICE — GLV SURG BIOGEL LTX PF 8 1/2

## (undated) DEVICE — SOL ISO/ALC RUB 70PCT 4OZ

## (undated) DEVICE — POOLE SUCTION HANDLE: Brand: CARDINAL HEALTH

## (undated) DEVICE — TUBING, SUCTION, 1/4" X 20', STRAIGHT: Brand: MEDLINE INDUSTRIES, INC.

## (undated) DEVICE — 3M™ IOBAN™ 2 ANTIMICROBIAL INCISE DRAPE 6640EZ: Brand: IOBAN™ 2

## (undated) DEVICE — APPL CHLORAPREP W/TINT 26ML ORNG

## (undated) DEVICE — HANDPIECE SET WITH COAXIAL HIGH FLOW TIP AND SUCTION TUBE: Brand: INTERPULSE

## (undated) DEVICE — GLV SURG TRIUMPH CLASSIC PF LTX 8.5 STRL

## (undated) DEVICE — GLV SURG SENSICARE MICRO PF LF 7.5 STRL

## (undated) DEVICE — SYR LUERLOK 30CC

## (undated) DEVICE — NDL SPINE 22G 31/2IN BLK

## (undated) DEVICE — PK SHLDR OPN 40

## (undated) DEVICE — DRAPE,U/ SHT,SPLIT,PLAS,STERIL: Brand: MEDLINE

## (undated) DEVICE — LOU TUR: Brand: MEDLINE INDUSTRIES, INC.

## (undated) DEVICE — PREP SOL POVIDONE/IODINE BT 4OZ

## (undated) DEVICE — DRSNG TELFA PAD NONADH STR 1S 3X8IN

## (undated) DEVICE — ELECTRD ROLL BALL RESECTOSCP ANG

## (undated) DEVICE — 2108 SERIES SAGITTAL BLADE (19.5 X 1.27 X 81.0MM)

## (undated) DEVICE — SKIN PREP TRAY W/CHG: Brand: MEDLINE INDUSTRIES, INC.